# Patient Record
Sex: MALE | Race: WHITE | NOT HISPANIC OR LATINO | Employment: OTHER | URBAN - METROPOLITAN AREA
[De-identification: names, ages, dates, MRNs, and addresses within clinical notes are randomized per-mention and may not be internally consistent; named-entity substitution may affect disease eponyms.]

---

## 2017-12-13 ENCOUNTER — OFFICE VISIT (OUTPATIENT)
Dept: URGENT CARE | Age: 37
End: 2017-12-13
Payer: COMMERCIAL

## 2017-12-13 PROCEDURE — G0382 LEV 3 HOSP TYPE B ED VISIT: HCPCS | Performed by: FAMILY MEDICINE

## 2017-12-13 PROCEDURE — 99283 EMERGENCY DEPT VISIT LOW MDM: CPT | Performed by: FAMILY MEDICINE

## 2017-12-15 NOTE — PROGRESS NOTES
Assessment  1  Sinusitis (473 9) (J32 9)    Plan  Sinusitis    · LevoFLOXacin 500 MG Oral Tablet (Levaquin); TAKE 1 TABLET DAILY ASDIRECTED    Discussion/Summary  Discussion Summary: This was likely viral when it was treated with an antibiotic previously  push fluids and rest Mucinex as directed  Flonase as directed  Cool mist humidifier in your bedroom at bedtime  if no improvement in 3-4 days, begin Levaquin  Probiotics if taking  Avoid weight lifting or running for 2 weeks while taking  Follow up with your family physician if you fail to improve  Medication Side Effects Reviewed: Possible side effects of new medications were reviewed with the patient/guardian today  Understands and agrees with treatment plan: The treatment plan was reviewed with the patient/guardian  The patient/guardian understands and agrees with the treatment plan   Follow Up Instructions: Follow Up with your Primary Care Provider in 5 days  If your symptoms worsen, go to the nearest [place] [address] Emergency Department  Chief Complaint  1  Cold Symptoms  Chief Complaint Free Text Note Form: Was treated with Z-adam x 5 days for sinus infection (skype visit via insurance)  Continues with harsh, dry cough, nasal congestion with forehead pain, sl  chest tightness, occ  chills and more fatigue  Taking Mucinex D  No sore throat, ear pain  No Flu vaccine  History of Present Illness  HPI: 45 y/o male presents with c/o sinus congestion, nassal drainage, and cough x 1 5 weeks  Finished a Lenell Line today  Was ill for 2 days prior to taking Zpak  States fatigue  No fever  Has been taking mucinex D (recommended avoiding decongestants)  Hospital Based Practices Required Assessment:  Pain Assessment  the patient states they have pain  The pain is located in the fatigue and sinuses and cough  (on a scale of 0 to 10, the patient rates the pain at 8 )  Abuse And Domestic Violence Screen   Yes, the patient is safe at home  -- The patient states no one is hurting them  Depression And Suicide Screen  No, the patient has not had thoughts of hurting themself  No, the patient has not felt depressed in the past 7 days  Prefered Language is  [place]  Primary Language is  English  Review of Systems  Focused-Male:  Constitutional: feeling poorly-- and-- feeling tired, but-- no fever  ENT: nasal discharge, but-- no sore throat  Respiratory: cough, but-- no shortness of breath-- and-- no wheezing  ROS Reviewed:   ROS reviewed  Active Problems  1  Encounter for sterilization (V25 2) (Z30 2)   2  Encounter for vasectomy counseling (V25 09) (Z30 09)    Past Medical History  1  History of asthma (V12 69) (Z87 09)  Active Problems And Past Medical History Reviewed: The active problems and past medical history were reviewed and updated today  Family History  Family History Reviewed: The family history was reviewed and updated today  Social History   · Denies alcohol consumption (V49 89) (Z78 9)   · Denied: History of Drug use   · Never a smoker    Surgical History  1  History of Surgery Vas Deferens Vasectomy    Current Meds   1  No Reported Medications Recorded    Allergies  1  Penicillins    Vitals  Signs   Recorded: [id number] 07:09PM   Temperature: 98 1 F, Oral  Heart Rate: 70  Pulse Quality: Regular  Respiration: 18  Systolic: 017, RUE, Sitting  Diastolic: 70, RUE, Sitting  Height: 6 ft 2 in  Weight: 219 lb 3 2 oz  BMI Calculated: 28 14  BSA Calculated: 2 26  O2 Saturation: 97  Pain Scale: 8    Physical Exam   Constitutional ill appearing, NAD  Ears, Nose, Mouth, and Throat  External inspection of ears and nose: Normal    Otoscopic examination: Tympanic membrance translucent with normal light reflex  Canals patent without erythema  Nasal mucosa, septum, and turbinates: Normal without edema or erythema  Oropharynx: Normal with no erythema, edema, exudate or lesions     Pulmonary  Respiratory effort: No increased work of breathing or signs of respiratory distress  Auscultation of lungs: Clear to auscultation  Cardiovascular  Auscultation of heart: Normal rate and rhythm, normal S1 and S2, without murmurs  Lymphatic  Palpation of lymph nodes in neck: No lymphadenopathy     Psychiatric  Orientation to person, place and time: Normal    Mood and affect: Normal        Signatures   Electronically signed by : Tha Christian [facility]; Dec 13 2017  7:26PM EST                       (Author)    Electronically signed by : Ruth Ann Claire DO; Dec 14 2017  7:07AM EST                       (Co-author)

## 2018-01-23 VITALS
HEART RATE: 70 BPM | TEMPERATURE: 98.1 F | OXYGEN SATURATION: 97 % | SYSTOLIC BLOOD PRESSURE: 110 MMHG | DIASTOLIC BLOOD PRESSURE: 70 MMHG | RESPIRATION RATE: 18 BRPM | BODY MASS INDEX: 28.13 KG/M2 | HEIGHT: 74 IN | WEIGHT: 219.2 LBS

## 2019-08-13 ENCOUNTER — APPOINTMENT (OUTPATIENT)
Dept: RADIOLOGY | Facility: OTHER | Age: 39
End: 2019-08-13
Payer: COMMERCIAL

## 2019-08-13 ENCOUNTER — OFFICE VISIT (OUTPATIENT)
Dept: OBGYN CLINIC | Facility: OTHER | Age: 39
End: 2019-08-13
Payer: COMMERCIAL

## 2019-08-13 VITALS
DIASTOLIC BLOOD PRESSURE: 71 MMHG | HEIGHT: 74 IN | SYSTOLIC BLOOD PRESSURE: 104 MMHG | HEART RATE: 50 BPM | WEIGHT: 216.4 LBS | BODY MASS INDEX: 27.77 KG/M2

## 2019-08-13 DIAGNOSIS — M75.22 BICEPS TENDONITIS, LEFT: ICD-10-CM

## 2019-08-13 DIAGNOSIS — M25.512 LEFT SHOULDER PAIN, UNSPECIFIED CHRONICITY: ICD-10-CM

## 2019-08-13 DIAGNOSIS — M75.42 IMPINGEMENT SYNDROME OF LEFT SHOULDER: Primary | ICD-10-CM

## 2019-08-13 PROCEDURE — 99204 OFFICE O/P NEW MOD 45 MIN: CPT | Performed by: ORTHOPAEDIC SURGERY

## 2019-08-13 PROCEDURE — 73030 X-RAY EXAM OF SHOULDER: CPT

## 2019-08-13 NOTE — PROGRESS NOTES
Assessment  Diagnoses and all orders for this visit:    Impingement syndrome of left shoulder  -     XR shoulder 2+ vw left; Future  -     Ambulatory referral to Physical Therapy; Future    Biceps tendonitis, left  -     Ambulatory referral to Physical Therapy; Future        Discussion and Plan:    The patient has an examination consistent with subacromial impingement syndrome of the left shoulder  I have discussed with the patient the pathophysiology of this diagnosis and reviewed how the examination correlates with this diagnosis  Treatment options were discussed at length and after discussing these treatment options, the patient elected for and received a prescription for referral to physical therapy  We will reevaluate the patient in 6-8 weeks  If the symptoms fail to improve with this treatment the patient would be indicated for a trial of a corticosteroid injection or further imaging in the form of an MRI arthrogram scan of the shoulder  I would consider the arthrogram to evaluate for possible labral pathology given his history and symptoms that he is describing as well as to understand the integrity of the rotator cuff structurally  The patient is also complaining of left distal biceps pain and tendinitis of the distal biceps  He may even have a partial injury to the distal biceps from this event he describes 6 months ago  Since he is seeing physical therapy for shoulder I recommended they try to treat his distal biceps tendinitis and if that is unsuccessful then imaging of his left elbow to evaluate the biceps tendon would be indicated  He has a negative biceps active test today so I do not feel there is a full rupture of the distal biceps clinically and therefore imaging is not indicated at this point      Subjective:   Patient ID: Chana Upton is a 43 y o  male      Pt is her today for evaluation for 2 separate chief complaint, the 1st time primary would be his left shoulder pain and the 2nd is left elbow pain at the distal biceps tendon    Pt states that he has been having pain in the posterior and lateral aspects of his left shoulder  Pt denies specific injury  Pt states that he had taken a break from the gym and returned after 3 months  Pt states that when he progressed up to 55 push ups per session about 2 months ago he began having pain  Pt states that he has pain with the pushing motion  Pt denies pain with lifting and pulling  Pt states that he has not tried OTC medication for the pain in his left shoulder  Pt also reports pain at the distal aspect of his biceps and limited strength with curls  Pt states that this has been going on for about 6 months and to begin with a subacute injury at that time year              The following portions of the patient's history were reviewed and updated as appropriate: allergies, current medications, past family history, past medical history, past social history, past surgical history and problem list     Review of Systems   Constitutional: Negative for chills, fever and unexpected weight change  HENT: Negative for hearing loss, nosebleeds and sore throat  Eyes: Negative for pain, redness and visual disturbance  Respiratory: Negative for cough, shortness of breath and wheezing  Cardiovascular: Negative for chest pain, palpitations and leg swelling  Gastrointestinal: Negative for abdominal pain, nausea and vomiting  Endocrine: Negative for polydipsia and polyuria  Genitourinary: Negative for dysuria and hematuria  Skin: Negative for rash and wound  Neurological: Negative for light-headedness, numbness and headaches  Psychiatric/Behavioral: Negative for decreased concentration, dysphoric mood and suicidal ideas  The patient is not nervous/anxious  Objective:  Left Elbow Exam     Tenderness   Left elbow tenderness location: tender to palpation distal biceps tendon       Range of Motion   Extension: normal   Flexion: normal     Muscle Strength   Supination:  5/5     Comments:  Pain with resisted extension with the arm supinated   Biceps intact with biceps active test         Right Shoulder Exam     Range of Motion   The patient has normal right shoulder ROM  Left Shoulder Exam     Tenderness   The patient is experiencing no tenderness  Range of Motion   External rotation: 90   Forward flexion: 180     Muscle Strength   Abduction: 4/5   External rotation: 5/5     Tests   Loyd test: positive    Comments:  IR to mid thoracic  Positive O'britni's test                Physical Exam   Constitutional: He is oriented to person, place, and time  He appears well-developed and well-nourished  HENT:   Head: Normocephalic  Neck: Normal range of motion  Pulmonary/Chest: Effort normal and breath sounds normal    Neurological: He is alert and oriented to person, place, and time  Skin: Skin is warm and dry  Psychiatric: He has a normal mood and affect  I have personally reviewed pertinent films in PACS and my interpretation is as follows      X-rays of the left shoulder performed today show no acute osseous abnormality    Scribe Attestation    I,:   Yen Sims am acting as a scribe while in the presence of the attending physician :        I,:   Erick Crowley MD personally performed the services described in this documentation    as scribed in my presence :

## 2019-08-20 ENCOUNTER — EVALUATION (OUTPATIENT)
Dept: PHYSICAL THERAPY | Facility: OTHER | Age: 39
End: 2019-08-20
Payer: COMMERCIAL

## 2019-08-20 DIAGNOSIS — M75.22 BICEPS TENDONITIS, LEFT: ICD-10-CM

## 2019-08-20 DIAGNOSIS — M75.42 IMPINGEMENT SYNDROME OF LEFT SHOULDER: ICD-10-CM

## 2019-08-20 PROCEDURE — 97110 THERAPEUTIC EXERCISES: CPT | Performed by: PHYSICAL THERAPIST

## 2019-08-20 PROCEDURE — 97162 PT EVAL MOD COMPLEX 30 MIN: CPT | Performed by: PHYSICAL THERAPIST

## 2019-08-20 NOTE — PROGRESS NOTES
PT Evaluation     Today's date: 2019  Patient name: Rei Harding  : 1980  MRN: [id number]  Referring provider: Amol Woods MD  Dx:   Encounter Diagnosis     ICD-10-CM    1  Impingement syndrome of left shoulder M75 42 Ambulatory referral to Physical Therapy   2  Biceps tendonitis, left M75 22 Ambulatory referral to Physical Therapy       Start Time:   Stop Time: 08  Total time in clinic (min): 45 minutes    Assessment  Assessment details: Rei Harding is a pleasant 43 y o  male who presents with L subacromial impingement and biceps tendinitis  The patient's greatest concerns are worry over not knowing what's wrong, wanting to avoid surgery and fear of not being able to keep active  No further referral appears necessary at this time based upon examination results  Primary movement impairment diagnosis of L shoulder pain with movement coordination impairments limiting his ability to care for self, carry, exercise or recreation, go to work, lift, perform household chores and reach overhead  Primary Impairments:  1) poor scap stabilizer strength  2) poor shoulder flexibility  3) L shoulder pain    Etiologic factors include none recalled by the patient  Impairments: abnormal coordination, abnormal muscle firing, abnormal muscle tone, abnormal or restricted ROM, abnormal movement, activity intolerance, difficulty understanding, impaired physical strength, lacks appropriate home exercise program, pain with function, poor posture  and poor body mechanics    Symptom irritability: moderateUnderstanding of Dx/Px/POC: good   Prognosis: good  Prognosis details: Positive prognostic indicators include positive attitude toward recovery  Negative prognostic indicators include chronicity of symptoms, high symptom irritability  Goals  STG's to be achieved in 4 weeks:  1) Patient will have normal pain free AROM ob L shoulder  2) Patient will improve scap strength by 1/2 muscle grade  3) Patient will be able to complete ADL's and household chores such as laundry with no greater than 2/10 shoulder pain  LTG's to be achieved in 8 weeks:  1) Patient will be independent and compliant with HEP  2) Patient will be able to complete 25 pushups with normal mechanics with no greater than 2/10 L shoulder pain  3) Patient will score 77 or greater on FOTO  Plan  Patient would benefit from: skilled physical therapy  Planned modality interventions: thermotherapy: hydrocollator packs  Planned therapy interventions: activity modification, joint mobilization, manual therapy, motor coordination training, neuromuscular re-education, patient education, self care, therapeutic activities, therapeutic exercise, graded activity, home exercise program and behavior modification  Frequency: 2x week  Duration in weeks: 12  Treatment plan discussed with: patient        Subjective Evaluation    History of Present Illness  Date of onset: 2019  Mechanism of injury: Patient reports he has not had injury  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, active in the gym, recently had a heavy work schedule and limited time in the gym and noted when resuming gym schedule pain in the back of the shoulder so painful he is unable to complete a pushup  Patient is unable to complete overhead press or reach out due to pain  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  Notes difficulty when lifting heavy items for work such as stoves, washing machines, ripping up carpet  Notes issues when reaching behind back and is unable to scratch his back     Pain  Current pain ratin  At best pain ratin  At worst pain ratin          Objective     Observations     Additional Observation Details  Forward head, rounded shoulders    Limited pec and lat flexibility b/l when supine on table    Winging scapula b/l with AROM of shoulder    Palpation     Additional Palpation Details  Creppitus, clicking felt in biceps tendon with active ER of L shoulder    Cervical/Thoracic Screen   Thoracic range of motion within normal limits with the following exceptions: rhodes thoracic mobility, kyphosis    Active Range of Motion   Left Shoulder   Normal active range of motion  Flexion: with pain  Abduction: with pain  Internal rotation BTB: with pain    Right Shoulder   Normal active range of motion    Strength/Myotome Testing     Left Shoulder     Planes of Motion   External rotation at 0°: 5   External rotation at 90°: 4+   Internal rotation at 0°: 5   Internal rotation at 90°: 4+     Isolated Muscles   Latissimus: 3+   Lower trapezius: 3+   Middle deltoid: 3+   Middle trapezius: 3+   Serratus anterior: 3+     Right Shoulder     Planes of Motion   External rotation at 0°: 5   External rotation at 90°: 4+   Internal rotation at 0°: 5   Internal rotation at 90°: 4+     Isolated Muscles   Latissimus: 4-   Lower trapezius: 4-   Middle deltoid: 4-   Middle trapezius: 4-   Serratus anterior: 4-     Tests     Left Shoulder   Positive crossover, Hawkin's, Neer's, painful arc, passive horizontal adduction, scapular relocation , scapular assistance test positive and bicep load          Flowsheet Rows      Most Recent Value   PT/OT G-Codes   Current Score  60   Projected Score  77             Precautions: none      Manual  8/20            GH mobs inf/post                                                                     Exercise Diary  8/20            UBE             pulley's             SL ER 5# 3 x 10            scap squeeze w/ ER iso Peach TB 3 x 10 5" hold            pec stretch 10 x 10"            Sleeper stretch standing 10 x 10"            TB rows, LPD             PBALL YTI                                                                                                                                                                             Modalities

## 2019-08-22 ENCOUNTER — OFFICE VISIT (OUTPATIENT)
Dept: PHYSICAL THERAPY | Facility: OTHER | Age: 39
End: 2019-08-22
Payer: COMMERCIAL

## 2019-08-22 DIAGNOSIS — M75.22 BICEPS TENDONITIS, LEFT: ICD-10-CM

## 2019-08-22 DIAGNOSIS — M75.42 IMPINGEMENT SYNDROME OF LEFT SHOULDER: Primary | ICD-10-CM

## 2019-08-22 PROCEDURE — 97110 THERAPEUTIC EXERCISES: CPT

## 2019-08-22 PROCEDURE — 97140 MANUAL THERAPY 1/> REGIONS: CPT | Performed by: PHYSICAL THERAPIST

## 2019-08-22 PROCEDURE — 97112 NEUROMUSCULAR REEDUCATION: CPT | Performed by: PHYSICAL THERAPIST

## 2019-08-22 NOTE — PROGRESS NOTES
Daily Note     Today's date: 2019  Patient name: Brianne Chavez  : 1980  MRN: [id number]  Referring provider: Ezequiel Woodruff MD  Dx:   Encounter Diagnosis     ICD-10-CM    1  Impingement syndrome of left shoulder M75 42    2  Biceps tendonitis, left M75 22        Start Time: 1530  Stop Time: 1615  Total time in clinic (min): 45 minutes  1 on 1 with PT PTA from 330-400, not billed remainder  Subjective: Patient reports improvement in mobility, since IE with added stretches but notes pain is approximately the same  Objective: See treatment diary below      Assessment: Tolerated treatment well  Patient demonstrated fatigue post treatment  Patient easily fatigued with scap strengthening progressing this visit  Patient requiring frequent cueing to avoid upper trap compensation  Plan: Continue per plan of care        Precautions: none      Manual              GH mobs inf/post EB                                                                    Exercise Diary             UBE  2/2           pulley's             SL ER 5# 3 x 10 5# 3 x 10            scap squeeze w/ ER iso Peach TB 3 x 10 5" hold peach TB 3 x 10 5" hol           pec stretch 10 x 10" 30" x 3            Sleeper stretch standing 10 x 10" 10 x 10"           Biceps stretch  10 x 10"           TB rows, LPD  GTB 3 x 10            PBALL YTI  1# 2 x 10           Prone row  8# 3 x 10                                                                                                                                                              Modalities

## 2019-08-27 ENCOUNTER — OFFICE VISIT (OUTPATIENT)
Dept: PHYSICAL THERAPY | Facility: OTHER | Age: 39
End: 2019-08-27
Payer: COMMERCIAL

## 2019-08-27 DIAGNOSIS — M75.42 IMPINGEMENT SYNDROME OF LEFT SHOULDER: Primary | ICD-10-CM

## 2019-08-27 DIAGNOSIS — M75.22 BICEPS TENDONITIS, LEFT: ICD-10-CM

## 2019-08-27 PROCEDURE — 97110 THERAPEUTIC EXERCISES: CPT | Performed by: PHYSICAL THERAPIST

## 2019-08-27 PROCEDURE — 97140 MANUAL THERAPY 1/> REGIONS: CPT | Performed by: PHYSICAL THERAPIST

## 2019-08-27 PROCEDURE — 97112 NEUROMUSCULAR REEDUCATION: CPT | Performed by: PHYSICAL THERAPIST

## 2019-08-27 NOTE — PROGRESS NOTES
Daily Note     Today's date: 2019  Patient name: Caitie Cerda  : 1980  MRN: [id number]  Referring provider: Matt Leonardo MD  Dx:   Encounter Diagnosis     ICD-10-CM    1  Impingement syndrome of left shoulder M75 42    2  Biceps tendonitis, left M75 22        Start Time: 1000  Stop Time: 1050  Total time in clinic (min): 50 minutes  1 on 1 with PT for entirety    Subjective: Patient reports soreness from pushing his stretches to the point of pain  Objective: See treatment diary below      Assessment: Tolerated treatment well  Patient demonstrated fatigue post treatment  Educated patient on stretching, should be comfortable stretch but not until the point of pain, should not have pain beyond cessation of stretch  Patient easily fatigued with scap strengthening progressing this visit  Patient requiring frequent cueing to avoid upper trap compensation  Plan: Continue per plan of care  Consider EPAT for biceps for future visit        Precautions: none      Manual            GH mobs inf/post EB EB EB                                                                  Exercise Diary            UBE  2/2 3/3          SL ER 5# 3 x 10 5# 3 x 10  5# 3 x 10           scap squeeze w/ ER iso Peach TB 3 x 10 5" hold peach TB 3 x 10 5" hol peach TB 3 x 10 5"           pec stretch 10 x 10" 30" x 3  30" x 3           Sleeper stretch standing 10 x 10" 10 x 10" 10 x 10"          Biceps stretch  10 x 10" 10 x 10"          TB rows, LPD  GTB 3 x 10  GTB 3 x 10           PBALL YTI  1# 2 x 10 3 x 10 no resistance          Prone row  8# 3 x 10 8# 2 x 10           Serratus wall slide w/ ER iso   peach 5" x 10          Push up    From 18" x 10                                                                                                                                    Modalities

## 2019-08-29 ENCOUNTER — OFFICE VISIT (OUTPATIENT)
Dept: PHYSICAL THERAPY | Facility: OTHER | Age: 39
End: 2019-08-29
Payer: COMMERCIAL

## 2019-08-29 DIAGNOSIS — M75.42 IMPINGEMENT SYNDROME OF LEFT SHOULDER: Primary | ICD-10-CM

## 2019-08-29 DIAGNOSIS — M75.22 BICEPS TENDONITIS, LEFT: ICD-10-CM

## 2019-08-29 PROCEDURE — 97110 THERAPEUTIC EXERCISES: CPT | Performed by: PHYSICAL THERAPIST

## 2019-08-29 PROCEDURE — 97112 NEUROMUSCULAR REEDUCATION: CPT | Performed by: PHYSICAL THERAPIST

## 2019-08-29 PROCEDURE — 97140 MANUAL THERAPY 1/> REGIONS: CPT | Performed by: PHYSICAL THERAPIST

## 2019-08-29 NOTE — PROGRESS NOTES
Daily Note     Today's date: 2019  Patient name: Kishore Martínez  : 1980  MRN: [id number]  Referring provider: Rod Rojas MD  Dx:   Encounter Diagnosis     ICD-10-CM    1  Impingement syndrome of left shoulder M75 42    2  Biceps tendonitis, left M75 22        Start Time: 1030  Stop Time: 1125  Total time in clinic (min): 55 minutes  1 on 1 with PT from [id number]    Subjective: Patient reports increased soreness in "back of shoulder"   Reports soreness feels more like "muscle soreness" than deep shoulder pain that brought him to PT      Objective: See treatment diary below      Assessment: Tolerated treatment well  Patient demonstrated fatigue post treatment  Did not progress due to soreness following last session  Patient reporting improved soreness following added manuals and self-stretch  Plan: Continue per plan of care  Consider EPAT for biceps for future visit        Precautions: none      Manual           GH mobs inf/post EB EB EB EB         ISTM posterior cuff    EB                                                    Exercise Diary           UBE  2/2 3/3 3/3         SL ER 5# 3 x 10 5# 3 x 10  5# 3 x 10  5# 3 x 10          scap squeeze w/ ER iso Peach TB 3 x 10 5" hold peach TB 3 x 10 5" hol peach TB 3 x 10 5"  peach TB 3 x 10 5"          pec stretch 10 x 10" 30" x 3  30" x 3  30" x 3         Posterior capsule stretch    10 x 10"         Sleeper stretch standing 10 x 10" 10 x 10" 10 x 10" 10 x 10"         Biceps stretch  10 x 10" 10 x 10" 10 x 10"         TB rows, LPD  GTB 3 x 10  GTB 3 x 10  sandy 25# 3 x 10 LPD  50# row         PBALL YTI  1# 2 x 10 3 x 10 no resistance 3 x 10          Prone row  8# 3 x 10 8# 2 x 10  8# 3 x 10          Serratus wall slide w/ ER iso   peach 5" x 10 peach 5" x 10         Push up    From 18" x 10  18" x 10 Modalities

## 2019-09-03 ENCOUNTER — OFFICE VISIT (OUTPATIENT)
Dept: PHYSICAL THERAPY | Facility: OTHER | Age: 39
End: 2019-09-03
Payer: COMMERCIAL

## 2019-09-03 DIAGNOSIS — M75.22 BICEPS TENDONITIS, LEFT: ICD-10-CM

## 2019-09-03 DIAGNOSIS — M75.42 IMPINGEMENT SYNDROME OF LEFT SHOULDER: Primary | ICD-10-CM

## 2019-09-03 PROCEDURE — 97110 THERAPEUTIC EXERCISES: CPT | Performed by: PHYSICAL THERAPIST

## 2019-09-03 PROCEDURE — 97140 MANUAL THERAPY 1/> REGIONS: CPT | Performed by: PHYSICAL THERAPIST

## 2019-09-03 PROCEDURE — 97112 NEUROMUSCULAR REEDUCATION: CPT | Performed by: PHYSICAL THERAPIST

## 2019-09-03 NOTE — PROGRESS NOTES
Daily Note     Today's date: 9/3/2019  Patient name: Tiff Dear  : 1980  MRN: [id number]  Referring provider: Satish Monaco MD  Dx:   Encounter Diagnosis     ICD-10-CM    1  Impingement syndrome of left shoulder M75 42    2  Biceps tendonitis, left M75 22        Start Time: 1330  Stop Time: 1420  Total time in clinic (min): 50 minutes  1 on 1 with PT from 130- 200, not billed remainder    Subjective: Patient reports he resumed "back work" LPD, rows, and biceps and running in the gym without onset of shoulder pain  Objective: See treatment diary below      Assessment: Tolerated treatment well  Patient demonstrated fatigue post treatment  Patient fatigued with added resistance for UBE this visit  Plan: Continue per plan of care  Consider EPAT for biceps for future visit        Precautions: none      Manual  8/20 8/24 8/27 8/29 9/3        GH mobs inf/post EB EB EB EB EB        ISTM posterior cuff    EB EB                                                   Exercise Diary  8/20 8/24 8/27 8/29 9/3        UBE  2/2 3/3 3/3 3/3 Lv 2        SL ER 5# 3 x 10 5# 3 x 10  5# 3 x 10  5# 3 x 10  5# 3 x 10         scap squeeze w/ ER iso Peach TB 3 x 10 5" hold peach TB 3 x 10 5" hol peach TB 3 x 10 5"  peach TB 3 x 10 5"  peach TB 3 x 10 5"         pec stretch 10 x 10" 30" x 3  30" x 3  30" x 3 3 x 30"        Posterior capsule stretch    10 x 10" 3 x 30"        Sleeper stretch standing 10 x 10" 10 x 10" 10 x 10" 10 x 10" 30" x 3        Biceps stretch  10 x 10" 10 x 10" 10 x 10" NP        TB rows, LPD  GTB 3 x 10  GTB 3 x 10  sandy 25# 3 x 10 LPD  50# row sandy 30# 3 x 10 LPD, 50# row        PBALL YTI  1# 2 x 10 3 x 10 no resistance 3 x 10  3 x 10 add resistance NV        Prone row  8# 3 x 10 8# 2 x 10  8# 3 x 10  8# 3 x 10         Serratus wall slide w/ ER iso   peach 5" x 10 peach 5" x 10 peach 5" x 20        Push up    From 18" x 10  18" x 10  From floor 10 x         Box walk over     6", 8" 5 laps Elevated quadruped pull throughs     7 5# KB 3 x 5 ea                                                                                                       Modalities

## 2019-09-06 ENCOUNTER — OFFICE VISIT (OUTPATIENT)
Dept: PHYSICAL THERAPY | Facility: OTHER | Age: 39
End: 2019-09-06
Payer: COMMERCIAL

## 2019-09-06 DIAGNOSIS — M75.22 BICEPS TENDONITIS, LEFT: ICD-10-CM

## 2019-09-06 DIAGNOSIS — M75.42 IMPINGEMENT SYNDROME OF LEFT SHOULDER: Primary | ICD-10-CM

## 2019-09-06 PROCEDURE — 97112 NEUROMUSCULAR REEDUCATION: CPT | Performed by: PHYSICAL THERAPIST

## 2019-09-06 PROCEDURE — 97140 MANUAL THERAPY 1/> REGIONS: CPT | Performed by: PHYSICAL THERAPIST

## 2019-09-06 PROCEDURE — 97110 THERAPEUTIC EXERCISES: CPT | Performed by: PHYSICAL THERAPIST

## 2019-09-06 NOTE — PROGRESS NOTES
Daily Note     Today's date: 10/24/2019  Patient name: Pb Nelson  : 1980  MRN: [id number]  Referring provider: Piyush Augustin MD  Dx:   Encounter Diagnosis     ICD-10-CM    1  Impingement syndrome of left shoulder M75 42    2  Biceps tendonitis, left M75 22        Start Time: 0900  Stop Time: 1000  Total time in clinic (min): 60 minutes  1 on 1 with PT from 900, not billed remainder    Discharge Summary: Patient did not return phone calls from the  about scheduling additional appointments  Patient is discharged from formal PT at this time  Subjective: Patient reports he resumed running without shoulder pains notes he did light machine bench press with slight "twinges" and "stretching" but denies any pain  Objective: See treatment diary below      Assessment: Tolerated treatment well  Patient demonstrated fatigue post treatment  Patient fatigued with resistance for UBE this visit  Patient reported pain free AROM at end range flex, IR, abd after today's manuals  Limited patients session due to patient request, needing to leave for work      Plan: Continue per plan of care         Precautions: none      Manual   9/3 9/       GH mobs inf/post EB EB EB EB EB EB       ISTM posterior cuff    EB EB EB                                                  Exercise Diary  8/20 8/24 8/27 8/29 9/3 9/       UBE  2/2 3/3 33 3/3 Lv 2 3/3 L2       SL ER 5# 3 x 10 5# 3 x 10  5# 3 x 10  5# 3 x 10  5# 3 x 10         scap squeeze w/ ER iso Peach TB 3 x 10 5" hold peach TB 3 x 10 5" hol peach TB 3 x 10 5"  peach TB 3 x 10 5"  peach TB 3 x 10 5"         pec stretch 10 x 10" 30" x 3  30" x 3  30" x 3 3 x 30" 3 x 30"       Posterior capsule stretch    10 x 10" 3 x 30" 30" x 3       Sleeper stretch standing 10 x 10" 10 x 10" 10 x 10" 10 x 10" 30" x 3 30" x 3       Biceps stretch  10 x 10" 10 x 10" 10 x 10" NP        TB rows, LPD  GTB 3 x 10  GTB 3 x 10  sandy 25# 3 x 10 LPD  50# row sandy 30# 3 x 10 LPD, 50# row sandy 30# 3 x 10 LPD, 50# row       PBALL YTI  1# 2 x 10 3 x 10 no resistance 3 x 10  3 x 10 add resistance NV 3 x 10 1#       Prone row  8# 3 x 10 8# 2 x 10  8# 3 x 10  8# 3 x 10         Serratus wall slide w/ ER iso   peach 5" x 10 peach 5" x 10 peach 5" x 20        Push up    From 18" x 10  18" x 10  From floor 10 x         Box walk over     6", 8" 5 laps 6", bosu 8" 10 laps       Elevated quadruped pull throughs     7 5# KB 3 x 5 ea                                                                                                       Modalities

## 2021-01-03 ENCOUNTER — APPOINTMENT (EMERGENCY)
Dept: CT IMAGING | Facility: HOSPITAL | Age: 41
End: 2021-01-03
Payer: COMMERCIAL

## 2021-01-03 ENCOUNTER — HOSPITAL ENCOUNTER (OUTPATIENT)
Facility: HOSPITAL | Age: 41
Setting detail: OBSERVATION
Discharge: HOME | End: 2021-01-04
Attending: EMERGENCY MEDICINE | Admitting: SURGERY
Payer: COMMERCIAL

## 2021-01-03 ENCOUNTER — ANESTHESIA (OUTPATIENT)
Dept: PERIOP | Facility: HOSPITAL | Age: 41
End: 2021-01-03
Payer: COMMERCIAL

## 2021-01-03 ENCOUNTER — ANESTHESIA EVENT (OUTPATIENT)
Dept: PERIOP | Facility: HOSPITAL | Age: 41
End: 2021-01-03
Payer: COMMERCIAL

## 2021-01-03 VITALS — HEART RATE: 79 BPM

## 2021-01-03 DIAGNOSIS — K35.80 ACUTE APPENDICITIS, UNSPECIFIED ACUTE APPENDICITIS TYPE: Primary | ICD-10-CM

## 2021-01-03 LAB
ALBUMIN SERPL BCP-MCNC: 3.9 G/DL (ref 3.5–5)
ALP SERPL-CCNC: 66 U/L (ref 46–116)
ALT SERPL W P-5'-P-CCNC: 20 U/L (ref 12–78)
ANION GAP SERPL CALCULATED.3IONS-SCNC: 5 MMOL/L (ref 4–13)
AST SERPL W P-5'-P-CCNC: 14 U/L (ref 5–45)
BASOPHILS # BLD AUTO: 0.04 THOUSANDS/ΜL (ref 0–0.1)
BASOPHILS NFR BLD AUTO: 0 % (ref 0–1)
BILIRUB SERPL-MCNC: 1.21 MG/DL (ref 0.2–1)
BUN SERPL-MCNC: 12 MG/DL (ref 5–25)
CALCIUM SERPL-MCNC: 9 MG/DL (ref 8.3–10.1)
CHLORIDE SERPL-SCNC: 102 MMOL/L (ref 100–108)
CO2 SERPL-SCNC: 29 MMOL/L (ref 21–32)
CREAT SERPL-MCNC: 1.07 MG/DL (ref 0.6–1.3)
EOSINOPHIL # BLD AUTO: 0.22 THOUSAND/ΜL (ref 0–0.61)
EOSINOPHIL NFR BLD AUTO: 2 % (ref 0–6)
ERYTHROCYTE [DISTWIDTH] IN BLOOD BY AUTOMATED COUNT: 13.2 % (ref 11.6–15.1)
GFR SERPL CREATININE-BSD FRML MDRD: 86 ML/MIN/1.73SQ M
GLUCOSE SERPL-MCNC: 83 MG/DL (ref 65–140)
HCT VFR BLD AUTO: 45.5 % (ref 36.5–49.3)
HGB BLD-MCNC: 15.8 G/DL (ref 12–17)
IMM GRANULOCYTES # BLD AUTO: 0.06 THOUSAND/UL (ref 0–0.2)
IMM GRANULOCYTES NFR BLD AUTO: 1 % (ref 0–2)
LIPASE SERPL-CCNC: 83 U/L (ref 73–393)
LYMPHOCYTES # BLD AUTO: 2.09 THOUSANDS/ΜL (ref 0.6–4.47)
LYMPHOCYTES NFR BLD AUTO: 18 % (ref 14–44)
MCH RBC QN AUTO: 31.7 PG (ref 26.8–34.3)
MCHC RBC AUTO-ENTMCNC: 34.7 G/DL (ref 31.4–37.4)
MCV RBC AUTO: 91 FL (ref 82–98)
MONOCYTES # BLD AUTO: 1.22 THOUSAND/ΜL (ref 0.17–1.22)
MONOCYTES NFR BLD AUTO: 11 % (ref 4–12)
NEUTROPHILS # BLD AUTO: 7.7 THOUSANDS/ΜL (ref 1.85–7.62)
NEUTS SEG NFR BLD AUTO: 68 % (ref 43–75)
NRBC BLD AUTO-RTO: 0 /100 WBCS
PLATELET # BLD AUTO: 184 THOUSANDS/UL (ref 149–390)
PMV BLD AUTO: 10.1 FL (ref 8.9–12.7)
POTASSIUM SERPL-SCNC: 3.9 MMOL/L (ref 3.5–5.3)
PROT SERPL-MCNC: 7.7 G/DL (ref 6.4–8.2)
RBC # BLD AUTO: 4.98 MILLION/UL (ref 3.88–5.62)
SODIUM SERPL-SCNC: 136 MMOL/L (ref 136–145)
WBC # BLD AUTO: 11.33 THOUSAND/UL (ref 4.31–10.16)

## 2021-01-03 PROCEDURE — 99285 EMERGENCY DEPT VISIT HI MDM: CPT | Performed by: EMERGENCY MEDICINE

## 2021-01-03 PROCEDURE — 80053 COMPREHEN METABOLIC PANEL: CPT | Performed by: EMERGENCY MEDICINE

## 2021-01-03 PROCEDURE — 36415 COLL VENOUS BLD VENIPUNCTURE: CPT

## 2021-01-03 PROCEDURE — 44970 LAPAROSCOPY APPENDECTOMY: CPT | Performed by: SURGERY

## 2021-01-03 PROCEDURE — 99285 EMERGENCY DEPT VISIT HI MDM: CPT

## 2021-01-03 PROCEDURE — 83690 ASSAY OF LIPASE: CPT | Performed by: EMERGENCY MEDICINE

## 2021-01-03 PROCEDURE — 74177 CT ABD & PELVIS W/CONTRAST: CPT

## 2021-01-03 PROCEDURE — 96365 THER/PROPH/DIAG IV INF INIT: CPT

## 2021-01-03 PROCEDURE — 88304 TISSUE EXAM BY PATHOLOGIST: CPT | Performed by: PATHOLOGY

## 2021-01-03 PROCEDURE — 85025 COMPLETE CBC W/AUTO DIFF WBC: CPT | Performed by: EMERGENCY MEDICINE

## 2021-01-03 PROCEDURE — 96366 THER/PROPH/DIAG IV INF ADDON: CPT

## 2021-01-03 PROCEDURE — 99218 PR INITIAL OBSERVATION CARE/DAY 30 MINUTES: CPT | Performed by: SURGERY

## 2021-01-03 RX ORDER — PROPOFOL 10 MG/ML
INJECTION, EMULSION INTRAVENOUS CONTINUOUS PRN
Status: DISCONTINUED | OUTPATIENT
Start: 2021-01-03 | End: 2021-01-03

## 2021-01-03 RX ORDER — PROPOFOL 10 MG/ML
INJECTION, EMULSION INTRAVENOUS AS NEEDED
Status: DISCONTINUED | OUTPATIENT
Start: 2021-01-03 | End: 2021-01-03

## 2021-01-03 RX ORDER — BUPIVACAINE HYDROCHLORIDE 2.5 MG/ML
INJECTION, SOLUTION EPIDURAL; INFILTRATION; INTRACAUDAL AS NEEDED
Status: DISCONTINUED | OUTPATIENT
Start: 2021-01-03 | End: 2021-01-03 | Stop reason: HOSPADM

## 2021-01-03 RX ORDER — OXYCODONE HYDROCHLORIDE 5 MG/1
5 TABLET ORAL EVERY 4 HOURS PRN
Status: DISCONTINUED | OUTPATIENT
Start: 2021-01-03 | End: 2021-01-04 | Stop reason: HOSPADM

## 2021-01-03 RX ORDER — NEOSTIGMINE METHYLSULFATE 1 MG/ML
INJECTION INTRAVENOUS AS NEEDED
Status: DISCONTINUED | OUTPATIENT
Start: 2021-01-03 | End: 2021-01-03

## 2021-01-03 RX ORDER — HYDROMORPHONE HCL/PF 1 MG/ML
0.4 SYRINGE (ML) INJECTION
Status: DISCONTINUED | OUTPATIENT
Start: 2021-01-03 | End: 2021-01-03 | Stop reason: HOSPADM

## 2021-01-03 RX ORDER — OXYCODONE HYDROCHLORIDE 10 MG/1
10 TABLET ORAL EVERY 4 HOURS PRN
Status: DISCONTINUED | OUTPATIENT
Start: 2021-01-03 | End: 2021-01-04 | Stop reason: HOSPADM

## 2021-01-03 RX ORDER — LIDOCAINE HYDROCHLORIDE 10 MG/ML
INJECTION, SOLUTION EPIDURAL; INFILTRATION; INTRACAUDAL; PERINEURAL AS NEEDED
Status: DISCONTINUED | OUTPATIENT
Start: 2021-01-03 | End: 2021-01-03

## 2021-01-03 RX ORDER — MEPERIDINE HYDROCHLORIDE 25 MG/ML
12.5 INJECTION INTRAMUSCULAR; INTRAVENOUS; SUBCUTANEOUS
Status: DISCONTINUED | OUTPATIENT
Start: 2021-01-03 | End: 2021-01-03 | Stop reason: HOSPADM

## 2021-01-03 RX ORDER — DIPHENHYDRAMINE HYDROCHLORIDE 50 MG/ML
12.5 INJECTION INTRAMUSCULAR; INTRAVENOUS ONCE AS NEEDED
Status: DISCONTINUED | OUTPATIENT
Start: 2021-01-03 | End: 2021-01-03 | Stop reason: HOSPADM

## 2021-01-03 RX ORDER — SODIUM CHLORIDE, SODIUM LACTATE, POTASSIUM CHLORIDE, CALCIUM CHLORIDE 600; 310; 30; 20 MG/100ML; MG/100ML; MG/100ML; MG/100ML
75 INJECTION, SOLUTION INTRAVENOUS CONTINUOUS
Status: DISCONTINUED | OUTPATIENT
Start: 2021-01-03 | End: 2021-01-04

## 2021-01-03 RX ORDER — GLYCOPYRROLATE 0.2 MG/ML
INJECTION INTRAMUSCULAR; INTRAVENOUS AS NEEDED
Status: DISCONTINUED | OUTPATIENT
Start: 2021-01-03 | End: 2021-01-03

## 2021-01-03 RX ORDER — FENTANYL CITRATE/PF 50 MCG/ML
25 SYRINGE (ML) INJECTION
Status: DISCONTINUED | OUTPATIENT
Start: 2021-01-03 | End: 2021-01-03 | Stop reason: HOSPADM

## 2021-01-03 RX ORDER — CEFEPIME HYDROCHLORIDE 2 G/50ML
INJECTION, SOLUTION INTRAVENOUS AS NEEDED
Status: DISCONTINUED | OUTPATIENT
Start: 2021-01-03 | End: 2021-01-03

## 2021-01-03 RX ORDER — DEXAMETHASONE SODIUM PHOSPHATE 4 MG/ML
INJECTION, SOLUTION INTRA-ARTICULAR; INTRALESIONAL; INTRAMUSCULAR; INTRAVENOUS; SOFT TISSUE AS NEEDED
Status: DISCONTINUED | OUTPATIENT
Start: 2021-01-03 | End: 2021-01-03

## 2021-01-03 RX ORDER — ONDANSETRON 2 MG/ML
4 INJECTION INTRAMUSCULAR; INTRAVENOUS EVERY 4 HOURS PRN
Status: COMPLETED | OUTPATIENT
Start: 2021-01-03 | End: 2021-01-03

## 2021-01-03 RX ORDER — HYDROMORPHONE HCL/PF 1 MG/ML
0.5 SYRINGE (ML) INJECTION
Status: DISCONTINUED | OUTPATIENT
Start: 2021-01-03 | End: 2021-01-04 | Stop reason: HOSPADM

## 2021-01-03 RX ORDER — KETOROLAC TROMETHAMINE 30 MG/ML
INJECTION, SOLUTION INTRAMUSCULAR; INTRAVENOUS AS NEEDED
Status: DISCONTINUED | OUTPATIENT
Start: 2021-01-03 | End: 2021-01-03

## 2021-01-03 RX ORDER — LEVOFLOXACIN 5 MG/ML
750 INJECTION, SOLUTION INTRAVENOUS
Status: DISCONTINUED | OUTPATIENT
Start: 2021-01-03 | End: 2021-01-03

## 2021-01-03 RX ORDER — SUCCINYLCHOLINE/SOD CL,ISO/PF 100 MG/5ML
SYRINGE (ML) INTRAVENOUS AS NEEDED
Status: DISCONTINUED | OUTPATIENT
Start: 2021-01-03 | End: 2021-01-03

## 2021-01-03 RX ORDER — SODIUM CHLORIDE, SODIUM LACTATE, POTASSIUM CHLORIDE, CALCIUM CHLORIDE 600; 310; 30; 20 MG/100ML; MG/100ML; MG/100ML; MG/100ML
INJECTION, SOLUTION INTRAVENOUS CONTINUOUS PRN
Status: DISCONTINUED | OUTPATIENT
Start: 2021-01-03 | End: 2021-01-03

## 2021-01-03 RX ORDER — ROCURONIUM BROMIDE 10 MG/ML
INJECTION, SOLUTION INTRAVENOUS AS NEEDED
Status: DISCONTINUED | OUTPATIENT
Start: 2021-01-03 | End: 2021-01-03

## 2021-01-03 RX ORDER — ONDANSETRON 2 MG/ML
INJECTION INTRAMUSCULAR; INTRAVENOUS AS NEEDED
Status: DISCONTINUED | OUTPATIENT
Start: 2021-01-03 | End: 2021-01-03

## 2021-01-03 RX ORDER — FENTANYL CITRATE 50 UG/ML
INJECTION, SOLUTION INTRAMUSCULAR; INTRAVENOUS AS NEEDED
Status: DISCONTINUED | OUTPATIENT
Start: 2021-01-03 | End: 2021-01-03

## 2021-01-03 RX ADMIN — ONDANSETRON 4 MG: 2 INJECTION INTRAMUSCULAR; INTRAVENOUS at 21:18

## 2021-01-03 RX ADMIN — FENTANYL CITRATE 50 MCG: 50 INJECTION, SOLUTION INTRAMUSCULAR; INTRAVENOUS at 19:57

## 2021-01-03 RX ADMIN — KETOROLAC TROMETHAMINE 30 MG: 30 INJECTION, SOLUTION INTRAMUSCULAR at 20:30

## 2021-01-03 RX ADMIN — ONDANSETRON 4 MG: 2 INJECTION INTRAMUSCULAR; INTRAVENOUS at 20:03

## 2021-01-03 RX ADMIN — ROCURONIUM BROMIDE 5 MG: 10 SOLUTION INTRAVENOUS at 19:57

## 2021-01-03 RX ADMIN — ROCURONIUM BROMIDE 5 MG: 10 SOLUTION INTRAVENOUS at 20:17

## 2021-01-03 RX ADMIN — SODIUM CHLORIDE, SODIUM LACTATE, POTASSIUM CHLORIDE, AND CALCIUM CHLORIDE 125 ML/HR: .6; .31; .03; .02 INJECTION, SOLUTION INTRAVENOUS at 22:21

## 2021-01-03 RX ADMIN — METRONIDAZOLE 500 MG: 500 INJECTION, SOLUTION INTRAVENOUS at 20:03

## 2021-01-03 RX ADMIN — Medication 100 MG: at 19:57

## 2021-01-03 RX ADMIN — CEFEPIME HYDROCHLORIDE 2000 MG: 2 INJECTION, SOLUTION INTRAVENOUS at 19:51

## 2021-01-03 RX ADMIN — NEOSTIGMINE METHYLSULFATE 3 MG: 1 INJECTION INTRAVENOUS at 20:46

## 2021-01-03 RX ADMIN — GLYCOPYRROLATE 0.6 MG: 0.2 INJECTION, SOLUTION INTRAMUSCULAR; INTRAVENOUS at 20:46

## 2021-01-03 RX ADMIN — ROCURONIUM BROMIDE 5 MG: 10 SOLUTION INTRAVENOUS at 20:06

## 2021-01-03 RX ADMIN — ROCURONIUM BROMIDE 15 MG: 10 SOLUTION INTRAVENOUS at 20:01

## 2021-01-03 RX ADMIN — HYDROMORPHONE HYDROCHLORIDE 0.4 MG: 1 INJECTION, SOLUTION INTRAMUSCULAR; INTRAVENOUS; SUBCUTANEOUS at 21:13

## 2021-01-03 RX ADMIN — PROPOFOL 30 MG: 10 INJECTION, EMULSION INTRAVENOUS at 20:41

## 2021-01-03 RX ADMIN — DEXAMETHASONE SODIUM PHOSPHATE 4 MG: 4 INJECTION INTRA-ARTICULAR; INTRALESIONAL; INTRAMUSCULAR; INTRAVENOUS; SOFT TISSUE at 20:03

## 2021-01-03 RX ADMIN — SODIUM CHLORIDE, SODIUM LACTATE, POTASSIUM CHLORIDE, AND CALCIUM CHLORIDE 500 ML: .6; .31; .03; .02 INJECTION, SOLUTION INTRAVENOUS at 16:39

## 2021-01-03 RX ADMIN — FENTANYL CITRATE 25 MCG: 50 INJECTION, SOLUTION INTRAMUSCULAR; INTRAVENOUS at 20:41

## 2021-01-03 RX ADMIN — SODIUM CHLORIDE, SODIUM LACTATE, POTASSIUM CHLORIDE, AND CALCIUM CHLORIDE: .6; .31; .03; .02 INJECTION, SOLUTION INTRAVENOUS at 19:27

## 2021-01-03 RX ADMIN — HYDROMORPHONE HYDROCHLORIDE 0.4 MG: 1 INJECTION, SOLUTION INTRAMUSCULAR; INTRAVENOUS; SUBCUTANEOUS at 21:38

## 2021-01-03 RX ADMIN — PROPOFOL 230 MG: 10 INJECTION, EMULSION INTRAVENOUS at 19:57

## 2021-01-03 RX ADMIN — IOHEXOL 100 ML: 350 INJECTION, SOLUTION INTRAVENOUS at 16:58

## 2021-01-03 RX ADMIN — SODIUM CHLORIDE, SODIUM LACTATE, POTASSIUM CHLORIDE, AND CALCIUM CHLORIDE: .6; .31; .03; .02 INJECTION, SOLUTION INTRAVENOUS at 20:52

## 2021-01-03 RX ADMIN — LIDOCAINE HYDROCHLORIDE 50 MG: 10 INJECTION, SOLUTION EPIDURAL; INFILTRATION; INTRACAUDAL at 19:57

## 2021-01-03 NOTE — ED PROVIDER NOTES
History  Chief Complaint   Patient presents with    Abdominal Pain     pt c/o RLQ pain and nausea starting last pm      38 yr male c/o onset yesterday after lunch with rlq pain - not abrupt-- that has been constant and worsened over time- pos nausea- anorexia/ chills/ tactile fevers last night --  No gu comps- normal bm's-- no recent illness of anykind- going over bumps in car en route to er did hurt abdomen      History provided by:  Patient and spouse   used: No    Abdominal Pain  Pain location:  RLQ  Pain quality: aching    Pain radiates to:  Does not radiate  Associated symptoms: chills and nausea    Associated symptoms: no constipation, no diarrhea, no fatigue, no fever and no vomiting        None       History reviewed  No pertinent past medical history  Past Surgical History:   Procedure Laterality Date    WISDOM TOOTH EXTRACTION         History reviewed  No pertinent family history  I have reviewed and agree with the history as documented  E-Cigarette/Vaping     E-Cigarette/Vaping Substances     Social History     Tobacco Use    Smoking status: Never Smoker    Smokeless tobacco: Never Used   Substance Use Topics    Alcohol use: Never     Frequency: Never    Drug use: Never       Review of Systems   Constitutional: Positive for activity change, appetite change and chills  Negative for diaphoresis, fatigue, fever and unexpected weight change  HENT: Negative  Eyes: Negative  Respiratory: Negative  Cardiovascular: Negative  Gastrointestinal: Positive for abdominal pain and nausea  Negative for abdominal distention, anal bleeding, blood in stool, constipation, diarrhea, rectal pain and vomiting  Endocrine: Negative  Genitourinary: Negative  Musculoskeletal: Negative  Skin: Negative  Allergic/Immunologic: Negative  Neurological: Negative  Hematological: Negative  Psychiatric/Behavioral: Negative          Physical Exam  Physical Exam  Vitals signs and nursing note reviewed  Constitutional:       General: He is not in acute distress  Appearance: He is well-developed  He is not ill-appearing, toxic-appearing or diaphoretic  Comments: avss-- pulse ox 98 % on ra- interpretation is normal- no intervention    HENT:      Head: Normocephalic and atraumatic  Mouth/Throat:      Mouth: Mucous membranes are moist    Eyes:      General: No scleral icterus  Extraocular Movements: Extraocular movements intact  Pupils: Pupils are equal, round, and reactive to light  Comments: Mm pink   Cardiovascular:      Rate and Rhythm: Normal rate and regular rhythm  Heart sounds: Normal heart sounds  No murmur  No friction rub  No gallop  Pulmonary:      Effort: Pulmonary effort is normal  No respiratory distress  Breath sounds: Normal breath sounds  No stridor  No wheezing, rhonchi or rales  Chest:      Chest wall: No tenderness  Abdominal:      General: Abdomen is flat  Bowel sounds are normal  There is no distension or abdominal bruit  There are no signs of injury  Palpations: Abdomen is soft  There is no shifting dullness, fluid wave, hepatomegaly, splenomegaly, mass or pulsatile mass  Tenderness: There is abdominal tenderness in the right lower quadrant  There is no right CVA tenderness, left CVA tenderness, guarding or rebound  Negative signs include Barba's sign, Rovsing's sign, McBurney's sign, psoas sign and obturator sign  Hernia: No hernia is present  There is no hernia in the umbilical area, ventral area, left inguinal area, right femoral area, left femoral area or right inguinal area  Comments: Moderate rlq tenderness to palpation   Skin:     General: Skin is warm  Capillary Refill: Capillary refill takes less than 2 seconds  Coloration: Skin is not cyanotic, jaundiced, mottled or pale  Findings: No erythema or rash  Neurological:      General: No focal deficit present        Mental Status: He is alert and oriented to person, place, and time  Cranial Nerves: No cranial nerve deficit  Motor: No weakness  Comments: Normal non focal neuro exam    Psychiatric:         Mood and Affect: Mood normal  Mood is not anxious or depressed           Behavior: Behavior normal          Vital Signs  ED Triage Vitals   Temperature Pulse Respirations Blood Pressure SpO2   01/03/21 1533 01/03/21 1533 01/03/21 1533 01/03/21 1536 01/03/21 1533   97 9 °F (36 6 °C) 80 18 109/63 98 %      Temp Source Heart Rate Source Patient Position - Orthostatic VS BP Location FiO2 (%)   01/03/21 1533 01/03/21 1533 -- -- --   Oral Monitor         Pain Score       01/03/21 1533       1           Vitals:    01/03/21 1533 01/03/21 1536   BP:  109/63   Pulse: 80          Visual Acuity      ED Medications  Medications   lactated ringers bolus 500 mL (has no administration in time range)       Diagnostic Studies  Results Reviewed     Procedure Component Value Units Date/Time    Comprehensive metabolic panel [[id number]]  (Abnormal) Collected: 01/03/21 1540    Lab Status: Final result Specimen: Blood from Arm, Right Updated: 01/03/21 1608     Sodium 136 mmol/L      Potassium 3 9 mmol/L      Chloride 102 mmol/L      CO2 29 mmol/L      ANION GAP 5 mmol/L      BUN 12 mg/dL      Creatinine 1 07 mg/dL      Glucose 83 mg/dL      Calcium 9 0 mg/dL      AST 14 U/L      ALT 20 U/L      Alkaline Phosphatase 66 U/L      Total Protein 7 7 g/dL      Albumin 3 9 g/dL      Total Bilirubin 1 21 mg/dL      eGFR 86 ml/min/1 73sq m     Narrative:      [place] guidelines for Chronic Kidney Disease (CKD):     Stage 1 with normal or high GFR (GFR > 90 mL/min/1 73 square meters)    Stage 2 Mild CKD (GFR = 60-89 mL/min/1 73 square meters)    Stage 3A Moderate CKD (GFR = 45-59 mL/min/1 73 square meters)    Stage 3B Moderate CKD (GFR = 30-44 mL/min/1 73 square meters)    Stage 4 Severe CKD (GFR = 15-29 mL/min/1 73 square meters)    Stage 5 End Stage CKD (GFR <15 mL/min/1 73 square meters)  Note: GFR calculation is accurate only with a steady state creatinine    Lipase [[id number]]  (Normal) Collected: 01/03/21 1540    Lab Status: Final result Specimen: Blood from Arm, Right Updated: 01/03/21 1608     Lipase 83 u/L     CBC and differential [[id number]]  (Abnormal) Collected: 01/03/21 1540    Lab Status: Final result Specimen: Blood from Arm, Right Updated: 01/03/21 1554     WBC 11 33 Thousand/uL      RBC 4 98 Million/uL      Hemoglobin 15 8 g/dL      Hematocrit 45 5 %      MCV 91 fL      MCH 31 7 pg      MCHC 34 7 g/dL      RDW 13 2 %      MPV 10 1 fL      Platelets 202 Thousands/uL      nRBC 0 /100 WBCs      Neutrophils Relative 68 %      Immat GRANS % 1 %      Lymphocytes Relative 18 %      Monocytes Relative 11 %      Eosinophils Relative 2 %      Basophils Relative 0 %      Neutrophils Absolute 7 70 Thousands/µL      Immature Grans Absolute 0 06 Thousand/uL      Lymphocytes Absolute 2 09 Thousands/µL      Monocytes Absolute 1 22 Thousand/µL      Eosinophils Absolute 0 22 Thousand/µL      Basophils Absolute 0 04 Thousands/µL                  CT abdomen pelvis with contrast    (Results Pending)              Procedures  Procedures         ED Course  ED Course as of Jan 06 0955   Susy Barba Jan 03, 2021   1615 - er md note- labs were first nursed --       80 Er md note- pt offered pain medication refuses at this point      1743 - ER MD NOTE- PT- RE-EVALUATED- SOUND ASLEEP- DISCUSSED ALBS WITH WIFE AND WAITING FOR CT SCAN RESULTS- WILL CONTINUE TO 39 Rushonna Stapleton      0679 - ER MD NOTE- PT- RE-EVALUATED- NOW AWAKE--  PAIN STILL AT LOW LEVEL- AWARE OF PENDING CT SCAN RESUTS      1819 - ER MD NOTE- CT SCAN RESULTS D/W PT AND WIFE WHO ARE AWARE- GEN SURG RESIDENT TIGER TEXTED- WILL COME DOWN TO SEE PT- PT AND WIFE AWARE                                              MDM    Disposition  Final diagnoses:   None     ED Disposition     None Follow-up Information    None         Patient's Medications    No medications on file     No discharge procedures on file      PDMP Review     None          ED Provider  Electronically Signed by           Tamiko Lou MD  01/06/21 7760

## 2021-01-04 VITALS
BODY MASS INDEX: 25.68 KG/M2 | HEART RATE: 52 BPM | WEIGHT: 200 LBS | TEMPERATURE: 98 F | DIASTOLIC BLOOD PRESSURE: 55 MMHG | RESPIRATION RATE: 18 BRPM | SYSTOLIC BLOOD PRESSURE: 101 MMHG | OXYGEN SATURATION: 96 %

## 2021-01-04 LAB
ANION GAP SERPL CALCULATED.3IONS-SCNC: 6 MMOL/L (ref 4–13)
BUN SERPL-MCNC: 13 MG/DL (ref 5–25)
CALCIUM SERPL-MCNC: 8.6 MG/DL (ref 8.3–10.1)
CHLORIDE SERPL-SCNC: 102 MMOL/L (ref 100–108)
CO2 SERPL-SCNC: 26 MMOL/L (ref 21–32)
CREAT SERPL-MCNC: 1.05 MG/DL (ref 0.6–1.3)
ERYTHROCYTE [DISTWIDTH] IN BLOOD BY AUTOMATED COUNT: 13 % (ref 11.6–15.1)
GFR SERPL CREATININE-BSD FRML MDRD: 88 ML/MIN/1.73SQ M
GLUCOSE P FAST SERPL-MCNC: 129 MG/DL (ref 65–99)
GLUCOSE SERPL-MCNC: 129 MG/DL (ref 65–140)
HCT VFR BLD AUTO: 40 % (ref 36.5–49.3)
HGB BLD-MCNC: 13.7 G/DL (ref 12–17)
MCH RBC QN AUTO: 31.4 PG (ref 26.8–34.3)
MCHC RBC AUTO-ENTMCNC: 34.3 G/DL (ref 31.4–37.4)
MCV RBC AUTO: 92 FL (ref 82–98)
PLATELET # BLD AUTO: 169 THOUSANDS/UL (ref 149–390)
PMV BLD AUTO: 10.5 FL (ref 8.9–12.7)
POTASSIUM SERPL-SCNC: 4.3 MMOL/L (ref 3.5–5.3)
RBC # BLD AUTO: 4.36 MILLION/UL (ref 3.88–5.62)
SODIUM SERPL-SCNC: 134 MMOL/L (ref 136–145)
WBC # BLD AUTO: 9.6 THOUSAND/UL (ref 4.31–10.16)

## 2021-01-04 PROCEDURE — 80048 BASIC METABOLIC PNL TOTAL CA: CPT | Performed by: SURGERY

## 2021-01-04 PROCEDURE — 99024 POSTOP FOLLOW-UP VISIT: CPT | Performed by: SURGERY

## 2021-01-04 PROCEDURE — 85027 COMPLETE CBC AUTOMATED: CPT | Performed by: SURGERY

## 2021-01-04 RX ORDER — IBUPROFEN 200 MG
600 TABLET ORAL EVERY 6 HOURS PRN
Refills: 0
Start: 2021-01-04

## 2021-01-04 RX ORDER — ACETAMINOPHEN 325 MG/1
975 TABLET ORAL EVERY 6 HOURS PRN
Refills: 0
Start: 2021-01-04

## 2021-01-04 RX ORDER — OXYCODONE HYDROCHLORIDE 5 MG/1
5 TABLET ORAL EVERY 4 HOURS PRN
Qty: 10 TABLET | Refills: 0 | Status: SHIPPED | OUTPATIENT
Start: 2021-01-04 | End: 2021-01-14

## 2021-01-04 RX ADMIN — SODIUM CHLORIDE, SODIUM LACTATE, POTASSIUM CHLORIDE, AND CALCIUM CHLORIDE 125 ML/HR: .6; .31; .03; .02 INJECTION, SOLUTION INTRAVENOUS at 05:39

## 2021-01-04 RX ADMIN — METRONIDAZOLE 500 MG: 500 INJECTION, SOLUTION INTRAVENOUS at 12:35

## 2021-01-04 RX ADMIN — OXYCODONE HYDROCHLORIDE 5 MG: 5 TABLET ORAL at 11:45

## 2021-01-04 RX ADMIN — CEFTRIAXONE SODIUM 1000 MG: 10 INJECTION, POWDER, FOR SOLUTION INTRAVENOUS at 08:30

## 2021-01-04 RX ADMIN — METRONIDAZOLE 500 MG: 500 INJECTION, SOLUTION INTRAVENOUS at 03:26

## 2021-01-04 NOTE — ANESTHESIA PREPROCEDURE EVALUATION
Procedure:  APPENDECTOMY LAPAROSCOPIC (N/A Abdomen)    Relevant Problems   No relevant active problems        Physical Exam    Airway    Mallampati score: II         Dental   No notable dental hx     Cardiovascular      Pulmonary      Other Findings        Anesthesia Plan  ASA Score- 1 Emergent    Anesthesia Type- general with ASA Monitors  Additional Monitors:   Airway Plan: ETT  Comment: I, Dr Eloy Hernandez, the attending physician, have personally seen and evaluated the patient prior to anesthetic care  I have reviewed the pre-anesthetic record, and other medical records if appropriate to the anesthetic care  If a CRNA is involved in the case, I have reviewed the CRNA assessment, if present, and agree  The patient is in a suitable condition to proceed with my formulated anesthetic plan          Plan Factors-    Chart reviewed  Induction- intravenous  Postoperative Plan-     Informed Consent- Anesthetic plan and risks discussed with patient  I personally reviewed this patient with the CRNA  Discussed and agreed on the Anesthesia Plan with the CRNA  Ayan Reyes

## 2021-01-04 NOTE — ANESTHESIA POSTPROCEDURE EVALUATION
Post-Op Assessment Note    CV Status:  Stable  Pain Score: 0    Pain management: adequate     Mental Status:  Alert and awake   Hydration Status:  Euvolemic   PONV Controlled:  Controlled   Airway Patency:  Patent      Post Op Vitals Reviewed: Yes      Staff: CRNA         No complications documented      BP   128/66   Temp   98 5   Pulse  51   Resp   17   SpO2   100

## 2021-01-04 NOTE — PROGRESS NOTES
Progress Note - general Surgery   Will Bis 38 y o  male MRN: [id number]  Unit/Bed#: S -01 Encounter: [id number]    Assessment:  50-year-old male status post laparoscopic appendectomy on January 3rd    Plan:   Advance diet   Decrease IV fluid   Continue antibiotics for 24 hours after surgery   Ambulation   Possible discharge later today    Subjective/Objective     Subjective:   No acute event overnight, complaint apparently incisions, denies nausea vomiting, urinating okay, tolerating liquid diet    Objective:    Blood pressure 97/53, pulse (!) 46, temperature 97 8 °F (36 6 °C), temperature source Oral, resp  rate 18, weight 90 7 kg (200 lb), SpO2 95 %  ,Body mass index is 25 68 kg/m²        Intake/Output Summary (Last 24 hours) at 1/4/2021 0629  Last data filed at 1/4/2021 0525  Gross per 24 hour   Intake 2383 33 ml   Output 450 ml   Net 1933 33 ml       Invasive Devices     Peripheral Intravenous Line            Peripheral IV 01/03/21 Left Antecubital less than 1 day                Physical Exam:   Gen:  NAD  CV:  warm, well-perfused  Lungs: nl effort  Abd:  soft, appropriately tender, incision clean dry intact  Ext:  no CCE  Neuro: A&Ox3     Results from last 7 days   Lab Units 01/04/21  0529 01/03/21  1540   WBC Thousand/uL 9 60 11 33*   HEMOGLOBIN g/dL 13 7 15 8   HEMATOCRIT % 40 0 45 5   PLATELETS Thousands/uL 169 184     Results from last 7 days   Lab Units 01/04/21  0529 01/03/21  1540   POTASSIUM mmol/L 4 3 3 9   CHLORIDE mmol/L 102 102   CO2 mmol/L 26 29   BUN mg/dL 13 12   CREATININE mg/dL 1 05 1 07   CALCIUM mg/dL 8 6 9 0

## 2021-01-04 NOTE — H&P
H&P Exam - General Surgery   Diego Magallanes 38 y o  male MRN: [id number]  Unit/Bed#: JOSUE [facility]gu[facility] Encounter: [id number]    Assessment/Plan     Assessment:  37 yo M with acute appendicitis    Plan: Will perform laparoscopic appendicitis tonight  The risk and benefit of surgery was explained    History of Present Illness     HPI:  Diego Magallanes is a 38 y o  male who presents with RLQ pain for 24 hours  The pain was generalized at first but moved to RLQ, which was accompanied with nausea  Had fever last night  Had normal bowel movement this morning  Denies cough or SOB  No medical or surgical history  Review of Systems   All other systems reviewed and are negative  Historical Information   History reviewed  No pertinent past medical history    Past Surgical History:   Procedure Laterality Date    WISDOM TOOTH EXTRACTION       Social History   Social History     Substance and Sexual Activity   Alcohol Use Never    Frequency: Never     Social History     Substance and Sexual Activity   Drug Use Never     Social History     Tobacco Use   Smoking Status Never Smoker   Smokeless Tobacco Never Used     E-Cigarette/Vaping     E-Cigarette/Vaping Substances     Family History: non-contributory    Meds/Allergies   all medications and allergies reviewed  Allergies   Allergen Reactions    Penicillins Rash       Objective   First Vitals:   Blood Pressure: 109/63 (01/03/21 1536)  Pulse: 80 (01/03/21 1533)  Temperature: 97 9 °F (36 6 °C) (01/03/21 1533)  Temp Source: Oral (01/03/21 1533)  Respirations: 18 (01/03/21 1533)  Weight - Scale: 90 7 kg (200 lb) (01/03/21 1533)  SpO2: 98 % (01/03/21 1533)    Current Vitals:   Blood Pressure: 109/63 (01/03/21 1536)  Pulse: 80 (01/03/21 1533)  Temperature: 97 9 °F (36 6 °C) (01/03/21 1533)  Temp Source: Oral (01/03/21 1533)  Respirations: 18 (01/03/21 1533)  Weight - Scale: 90 7 kg (200 lb) (01/03/21 1533)  SpO2: 98 % (01/03/21 1533)      Intake/Output Summary (Last 24 hours) at 1/3/2021 735 [facility] filed at 1/3/2021 1810  Gross per 24 hour   Intake 500 ml   Output    Net 500 ml       Invasive Devices     Peripheral Intravenous Line            Peripheral IV 01/03/21 Left Antecubital less than 1 day                Physical Exam  Vitals signs and nursing note reviewed  Constitutional:       Appearance: He is well-developed  HENT:      Head: Normocephalic and atraumatic  Cardiovascular:      Rate and Rhythm: Normal rate and regular rhythm  Heart sounds: Normal heart sounds  Pulmonary:      Effort: Pulmonary effort is normal       Breath sounds: Normal breath sounds  Abdominal:      General: Abdomen is flat and scaphoid  Bowel sounds are normal       Palpations: Abdomen is soft  Tenderness: There is abdominal tenderness in the right lower quadrant, suprapubic area and left lower quadrant  Positive signs include Barba's sign  Skin:     General: Skin is warm  Capillary Refill: Capillary refill takes less than 2 seconds  Neurological:      General: No focal deficit present  Mental Status: He is alert and oriented to person, place, and time  Psychiatric:         Mood and Affect: Mood normal          Behavior: Behavior normal          Lab Results: I have personally reviewed pertinent lab results  Imaging: I have personally reviewed pertinent reports  EKG, Pathology, and Other Studies: I have personally reviewed pertinent reports  Code Status: No Order  Advance Directive and Living Will:      Power of :    POLST:      Counseling / Coordination of Care  Total floor / unit time spent today 30 minutes  Greater than 50% of total time was spent with the patient and / or family counseling and / or coordination of care  A description of the counseling / coordination of care: Malu Lubin

## 2021-01-04 NOTE — PLAN OF CARE
Problem: Potential for Falls  Goal: Patient will remain free of falls  Description: INTERVENTIONS:  - Assess patient frequently for physical needs  -  Identify cognitive and physical deficits and behaviors that affect risk of falls    -  [place] fall precautions as indicated by assessment   - Educate patient/family on patient safety including physical limitations  - Instruct patient to call for assistance with activity based on assessment  - Modify environment to reduce risk of injury  - Consider OT/PT consult to assist with strengthening/mobility  Outcome: Progressing     Problem: CARDIOVASCULAR - ADULT  Goal: Maintains optimal cardiac output and hemodynamic stability  Description: INTERVENTIONS:  - Monitor I/O, vital signs and rhythm  - Monitor for S/S and trends of decreased cardiac output  - Administer and titrate ordered vasoactive medications to optimize hemodynamic stability  - Assess quality of pulses, skin color and temperature  - Assess for signs of decreased coronary artery perfusion  - Instruct patient to report change in severity of symptoms  Outcome: Progressing     Problem: GASTROINTESTINAL - ADULT  Goal: Minimal or absence of nausea and/or vomiting  Description: INTERVENTIONS:  - Administer IV fluids if ordered to ensure adequate hydration  - Maintain NPO status until nausea and vomiting are resolved  - Nasogastric tube if ordered  - Administer ordered antiemetic medications as needed  - Provide nonpharmacologic comfort measures as appropriate  - Advance diet as tolerated, if ordered  - Consider nutrition services referral to assist patient with adequate nutrition and appropriate food choices  Outcome: Progressing  Goal: Maintains or returns to baseline bowel function  Description: INTERVENTIONS:  - Assess bowel function  - Encourage oral fluids to ensure adequate hydration  - Administer IV fluids if ordered to ensure adequate hydration  - Administer ordered medications as needed  - Encourage mobilization and activity  - Consider nutritional services referral to assist patient with adequate nutrition and appropriate food choices  Outcome: Progressing  Goal: Maintains adequate nutritional intake  Description: INTERVENTIONS:  - Monitor percentage of each meal consumed  - Identify factors contributing to decreased intake, treat as appropriate  - Assist with meals as needed  - Monitor I&O, weight, and lab values if indicated  - Obtain nutrition services referral as needed  Outcome: Progressing     Problem: METABOLIC, FLUID AND ELECTROLYTES - ADULT  Goal: Electrolytes maintained within normal limits  Description: INTERVENTIONS:  - Monitor labs and assess patient for signs and symptoms of electrolyte imbalances  - Administer electrolyte replacement as ordered  - Monitor response to electrolyte replacements, including repeat lab results as appropriate  - Instruct patient on fluid and nutrition as appropriate  Outcome: Progressing  Goal: Fluid balance maintained  Description: INTERVENTIONS:  - Monitor labs   - Monitor I/O and WT  - Instruct patient on fluid and nutrition as appropriate  - Assess for signs & symptoms of volume excess or deficit  Outcome: Progressing     Problem: SKIN/TISSUE INTEGRITY - ADULT  Goal: Skin integrity remains intact  Description: INTERVENTIONS  - Identify patients at risk for skin breakdown  - Assess and monitor skin integrity  - Assess and monitor nutrition and hydration status  - Monitor labs (i e  albumin)  - Assess for incontinence   - Turn and reposition patient  - Assist with mobility/ambulation  - Relieve pressure over bony prominences  - Avoid friction and shearing  - Provide appropriate hygiene as needed including keeping skin clean and dry  - Evaluate need for skin moisturizer/barrier cream  - Collaborate with interdisciplinary team (i e  Nutrition, Rehabilitation, etc )   - Patient/family teaching  Outcome: Progressing  Goal: Incision(s), wounds(s) or drain site(s) healing without S/S of infection  Description: INTERVENTIONS  - Assess and document risk factors for skin impairment   - Assess and document dressing, incision, wound bed, drain sites and surrounding tissue  - Consider nutrition services referral as needed  - Oral mucous membranes remain intact  - Provide patient/ family education  Outcome: Progressing  Goal: Oral mucous membranes remain intact  Description: INTERVENTIONS  - Assess oral mucosa and hygiene practices  - Implement preventative oral hygiene regimen  - Implement oral medicated treatments as ordered  - Initiate Nutrition services referral as needed  Outcome: Progressing     Problem: HEMATOLOGIC - ADULT  Goal: Maintains hematologic stability  Description: INTERVENTIONS  - Assess for signs and symptoms of bleeding or hemorrhage  - Monitor labs  - Administer supportive blood products/factors as ordered and appropriate  Outcome: Progressing

## 2021-01-04 NOTE — OP NOTE
OPERATIVE REPORT  PATIENT NAME: Diego Magallanes    :  1980  MRN: [id number]  Pt Location: [facility]    SURGERY DATE: 1/3/2021    Surgeon(s) and Role: Tadeo Chris MD - Primary     * Bret Rodriguez MD - Assisting    Preop Diagnosis:  Acute appendicitis, unspecified acute appendicitis type [D86 97]  With umbilical hernia  Post-Op Diagnosis Codes:     * Acute appendicitis, unspecified acute appendicitis type [D72 84]  With umbilical hernia    Procedure:    Laparoscopic appendectomy with open repair of umbilical hernia  Specimen(s):  ID Type Source Tests Collected by Time Destination   1 : APPENDIX Tissue Appendix TISSUE EXAM Wil Houston MD 1/3/2021 2040        Estimated Blood Loss:   Minimal    Drains:  NG/OG/Enteral Tube Orogastric 18 Fr Right mouth (Active)   Number of days: 0       Urethral Catheter (Active)   Number of days: 0       Anesthesia Type:   General    Operative Indications:  Acute appendicitis, unspecified acute appendicitis type [F52 91]  With umbilical hernia    Operative Findings:  1 5 cm umbilical hernia  Acutely inflamed thickened appendix with purulent exudate covering the whole length of the appendix  Complications:   None    Procedure and Technique:  The patient was brought to the operating room and was identified correctly by myself and the operating room staff  General anesthesia was provided by the Anesthesia team   A Garzon catheter was inserted  Parts were prepped and draped in the standard fashion  A time-out was performed  A transverse supraumbilical incision was made about 2 cm  It was deepened through the fascia  We encountered the umbilical hernia at that point  The umbilical hernia was dissected and the sac was excised  The content of the hernia was fat which was also excised  Through the hernia defect we inserted the Reyes port  Pneumoperitoneum was created  Findings were confirmed  A 12 mm port was then inserted in the left lower quadrant  Another 5 mm port was inserted in the suprapubic region  The appendix was grasped from the tip and the dissection the mesoappendix in the surrounding area was started using EnSeal device  The dissection was carried down to the base of the appendix  We clearly identified the ileocecal junction which was away from the area of her dissection  And [employer] path 45 mm stapler was then introduced and a white load was used to staple off the appendix at the base  Specimen was delivered in the specimen bag  Irrigation was performed of the pelvis as well as the staple line  Hemostasis was adequate  The specimen was removed and handed over to the circulating nurse for pathology  The ports were removed under full visual guidance  The 12 mm port site was closed using 0 Vicryl suture in interrupted fashion  The closure in such a fashion of the supraumbilical port also repaired the hernia  We did not place a mesh as it was a infected case  The skin was then closed using 4-0 Monocryl  The surgical glue was then applied  Garzon catheter was removed  Patient was reversed from anesthesia and was taken to the recovery under stable condition     I was present for the entire procedure    Patient Disposition:  PACU     SIGNATURE: Rosalva Martinez MD  DATE: January 3, 2021  TIME: 8:47 PM

## 2021-01-26 RX ORDER — BUPROPION HYDROCHLORIDE 150 MG/1
TABLET, EXTENDED RELEASE ORAL
COMMUNITY

## 2021-01-26 RX ORDER — ALBUTEROL SULFATE 90 UG/1
AEROSOL, METERED RESPIRATORY (INHALATION)
COMMUNITY
Start: 2014-03-27

## 2021-01-26 RX ORDER — LEVOFLOXACIN 500 MG/1
1 TABLET, FILM COATED ORAL DAILY
COMMUNITY
Start: 2017-12-13

## 2021-01-27 ENCOUNTER — OFFICE VISIT (OUTPATIENT)
Dept: SURGERY | Facility: CLINIC | Age: 41
End: 2021-01-27

## 2021-01-27 VITALS
RESPIRATION RATE: 18 BRPM | WEIGHT: 210 LBS | DIASTOLIC BLOOD PRESSURE: 80 MMHG | HEART RATE: 76 BPM | SYSTOLIC BLOOD PRESSURE: 122 MMHG | HEIGHT: 74 IN | BODY MASS INDEX: 26.95 KG/M2 | TEMPERATURE: 97.9 F

## 2021-01-27 DIAGNOSIS — K35.80 ACUTE APPENDICITIS: ICD-10-CM

## 2021-01-27 DIAGNOSIS — Z48.89 POSTOPERATIVE VISIT: Primary | ICD-10-CM

## 2021-01-27 PROCEDURE — 99024 POSTOP FOLLOW-UP VISIT: CPT | Performed by: SURGERY

## 2021-01-27 NOTE — PROGRESS NOTES
Assessment/Plan:Clean incision with peroxide daily  Daily dry dressing  F/U prn  No problem-specific Assessment & Plan notes found for this encounter  Diagnoses and all orders for this visit:    Postoperative visit    Acute appendicitis    Other orders  -     albuterol (ProAir HFA) 90 mcg/act inhaler; Inhale  -     buPROPion (Wellbutrin SR) 150 mg 12 hr tablet; Take by mouth  -     levofloxacin (LEVAQUIN) 500 mg tablet; Take 1 tablet by mouth daily          Subjective:      Patient ID: Saeid Perez is a 38 y o  male  60-year-old male patient who is 3 weeks status post laparoscopic appendectomy came for follow-up  No complaints of fever, pain  No complaints of diarrhea or constipation  No complaints of difficulty with the eating or drinking  His only complaint is that his left lower quadrant incision is slightly open  There is the minimum drainage from the incision  The following portions of the patient's history were reviewed and updated as appropriate: allergies, current medications, past family history, past medical history, past social history and problem list     Review of Systems   All other systems reviewed and are negative  Objective:      /80 (BP Location: Left arm, Patient Position: Sitting, Cuff Size: Adult)   Pulse 76   Temp 97 9 °F (36 6 °C)   Resp 18   Ht 6' 2" (1 88 m)   Wt 95 3 kg (210 lb)   BMI 26 96 kg/m²          Physical Exam  HENT:      Head: Normocephalic and atraumatic  Nose: Nose normal       Mouth/Throat:      Mouth: Mucous membranes are moist    Eyes:      Pupils: Pupils are equal, round, and reactive to light  Cardiovascular:      Rate and Rhythm: Normal rate and regular rhythm  Pulmonary:      Effort: Pulmonary effort is normal       Breath sounds: Normal breath sounds  Abdominal:      General: Bowel sounds are normal       Palpations: Abdomen is soft  Comments: Supraumbilical and suprapubic incision a completely healed    There is mild separation of skin amounting to 2 mm at the left lower quadrant incision  There is no cellulitis  There is minimum slough  Neurological:      Mental Status: He is alert

## 2022-11-10 ENCOUNTER — APPOINTMENT (OUTPATIENT)
Dept: RADIOLOGY | Age: 42
End: 2022-11-10

## 2022-11-10 ENCOUNTER — HOSPITAL ENCOUNTER (OUTPATIENT)
Dept: RADIOLOGY | Facility: HOSPITAL | Age: 42
Discharge: HOME | End: 2022-11-10

## 2022-11-10 ENCOUNTER — OFFICE VISIT (OUTPATIENT)
Dept: OBGYN CLINIC | Facility: CLINIC | Age: 42
End: 2022-11-10

## 2022-11-10 VITALS
WEIGHT: 207.4 LBS | HEIGHT: 74 IN | HEART RATE: 66 BPM | BODY MASS INDEX: 26.62 KG/M2 | SYSTOLIC BLOOD PRESSURE: 110 MMHG | DIASTOLIC BLOOD PRESSURE: 78 MMHG

## 2022-11-10 DIAGNOSIS — M75.82 TENDINITIS OF LEFT ROTATOR CUFF: ICD-10-CM

## 2022-11-10 DIAGNOSIS — M25.512 ACUTE PAIN OF LEFT SHOULDER: Primary | ICD-10-CM

## 2022-11-10 DIAGNOSIS — M25.512 ACUTE PAIN OF LEFT SHOULDER: ICD-10-CM

## 2022-11-10 DIAGNOSIS — M75.42 IMPINGEMENT SYNDROME OF LEFT SHOULDER: ICD-10-CM

## 2022-11-10 NOTE — PATIENT INSTRUCTIONS
Rotator Cuff Tendinitis   AMBULATORY CARE:   Rotator cuff tendinitis  is inflammation of the tendons in your shoulder joint  A tendon is a cord of tough tissue that connects your muscles to your bones  The rotator cuff is made up of a group of muscles and tendons that hold the shoulder joint in place  Common signs and symptoms:   Pain and swelling in your shoulder, especially when you lift your arm over your head    Pain that is worse after you sleep on the affected shoulder    Pain can become worse and you may have pain even when you are resting    Shoulder and arm weakness    Call your doctor or orthopedist if:   You have sudden shortness of breath or chest pain  Any part of your arm is numb, tingly, cold, blue, or pale  You have pain and swelling in your shoulder even after you take pain medicine  Your skin is itchy, swollen, or has a rash  Your symptoms are not getting better or are getting worse  You have questions or concerns about your condition or care  Treatment  may include any of the following:  Medicines  such as steroids or NSAIDs may be used to reduce swelling  This can help relieve pain  Steroids may be injected into the rotator cuff area  NSAIDs are available without a doctor's order  Ask your healthcare provider which medicine is right for you  Ask how much to take and when to take it  Take as directed  NSAIDs can cause stomach bleeding or kidney problems if not taken correctly  Surgery  may be needed if the pain and tightening in your shoulder do not go away  This may also be done if pain worsens or is so severe that it affects your daily activities  During surgery, your healthcare provider may remove bone spurs and inflamed tissue around the shoulder  Physical therapy  can help you improve movement and strength, and decrease pain  A physical therapist will teach you safe exercises   The exercises may help you move your shoulder normally again and strengthen your rotator cuff  You may learn changes to make to your daily activities that will help decrease stress on your tendons  Care for your rotator cuff tendinitis at home:   Rest as directed  Limit activity on your affected shoulder to decrease stress on the tendon  This may help prevent further damage, decrease pain, and promote healing  Apply ice on your shoulder area  Ice helps decrease swelling and pain  Ice may also help prevent tissue damage  Use an ice pack, or put crushed ice in a plastic bag  Cover it with a towel and place it on your shoulder for 15 to 20 minutes every hour or as directed  Keep your shoulder in the correct position so it will heal faster  This may be done by increasing the height of armrests while you work, drive, and sit  Try not to sleep on the side of your injured shoulder  If you are a woman, wear a sports bra so that the straps are closer to your neck  This may help decrease stress in the affected shoulder  Follow up with your doctor or orthopedist as directed:  Write down your questions so you remember to ask them during your visits  © Copyright [employer] 2022 Information is for End User's use only and may not be sold, redistributed or otherwise used for commercial purposes  All illustrations and images included in CareNotes® are the copyrighted property of A [employer] M [employer] Inc  or Kandace Ruiz   The above information is an  only  It is not intended as medical advice for individual conditions or treatments  Talk to your doctor, nurse or pharmacist before following any medical regimen to see if it is safe and effective for you

## 2022-11-10 NOTE — PROGRESS NOTES
Orthopaedic Surgery - Office Note  Susana Mathias (64 y o  male)   : 1980   MRN: [id number]  Encounter Date: 11/10/2022    Chief Complaint   Patient presents with   • Left Shoulder - Pain         Assessment/Plan  Diagnoses and all orders for this visit:    Acute pain of left shoulder  -     XR shoulder 2+ vw left; Future    Tendinitis of left rotator cuff    Impingement syndrome of left shoulder    The diagnosis as well as treatment options were reviewed with the patient in the office today  Was advised he has subjective complaints and physical exam findings were consistent with rotator cuff tendinitis and injury  I would recommend initial conservative treatment of formal physical therapy and home exercise program   In addition he will use Aleve 1 tablet twice daily with food stopping calling if any stomach upset occurs  Will ice the shoulder 20 minutes on 1 hour off 3 times a day  Will return in 3-4 weeks for repeat evaluation with the orthopedic surgeon at which time if he is not improved we will consider further advanced imaging  A full-thickness rotator cuff tear is felt unlikely at this time due to his lack of trauma  Risks and benefits of a subacromial cortisone injection were discussed at this time we will hold on an injection  Return for Recheck in 3 weeks with Dr Alex Raza  History of Present Illness  This is a new patient with left shoulder pain  Reports he has had symptoms for 3 weeks  He reports that he was doing cross fit wall pushups which involved being upside-down doing pushups leaning against the wall  He felt weakness and fatigue in the left shoulder in the following day had severe pain in the left shoulder  He reports there has been popping and clicking in the shoulder since that time  Reports that CrossFit he has been unable to do overhead lifting for an extended period of time and he is just altered his workouts  No paresthesias or neck pain are reported    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 but no specific trauma to the shoulder that he can recall  Treated in 2019 with Dr Theodora Haskins for left shoulder impingement and distal biceps tendinitis after returning to workout regime  Review of Systems  Pertinent items are noted in HPI  All other systems were reviewed and are negative  Physical Exam  /78   Pulse 66   Ht 6' 2" (1 88 m)   Wt 94 1 kg (207 lb 6 4 oz)   BMI 26 63 kg/m²   Cons: Appears well  No apparent distress  Psych: Alert  Oriented x3  Mood and affect normal   Eyes: PERRLA, EOMI  Resp: Normal effort  No audible wheezing or stridor  CV: Palpable pulse  No discernable arrhythmia  Lymph:  No palpable cervical, axillary, or inguinal lymphadenopathy  Skin: Warm  No palpable masses  No visible lesions  Neuro: Normal muscle tone  Normal and symmetric DTR's  Left shoulder has no skin breakdown lesions or signs of infection  He has no AC joint tenderness  A well-maintained active and passive range of motion which is full  Has end range of motion pain to forward flexion and internal rotation  Has a positive empty can test and noted weakness to internal rotation and external rotation at 4/5  He has a markedly positive impingement sign  He is neurovascularly intact in left upper extremity  There is no shoulder instability on clinical examination with a negative anterior apprehension and negative anterior-posterior compression glide testing  He has no tenderness in the bicipital groove  His elbow exam is within normal limits  Studies Reviewed  X-rays performed in the office today three views of the left shoulder show no acute fractures dislocations or bony abnormalities  He has a type 2 acromion predisposing to impingement  These x-rays read from orthopedic standpoint will await official radiologist interpretation    Procedures  No procedures today      Medical, Surgical, Family, and Social History  The patient's medical history, family history, and social history, were reviewed and updated as appropriate  History reviewed  No pertinent past medical history  Past Surgical History:   Procedure Laterality Date   • SC LAP,APPENDECTOMY N/A 1/3/2021    Procedure: APPENDECTOMY LAPAROSCOPIC;  Surgeon: Aida Cano MD;  Location: [facility];  Service: General   • WISDOM TOOTH EXTRACTION         History reviewed  No pertinent family history      Social History     Occupational History   • Not on file   Tobacco Use   • Smoking status: Never Smoker   • Smokeless tobacco: Never Used   Substance and Sexual Activity   • Alcohol use: Never   • Drug use: Never   • Sexual activity: Not on file       Allergies   Allergen Reactions   • Penicillins Rash         Current Outpatient Medications:   •  albuterol (PROVENTIL HFA,VENTOLIN HFA) 90 mcg/act inhaler, Inhale, Disp: , Rfl:   •  buPROPion (WELLBUTRIN SR) 150 mg 12 hr tablet, Take by mouth, Disp: , Rfl:   •  ibuprofen (MOTRIN) 200 mg tablet, Take 3 tablets (600 mg total) by mouth every 6 (six) hours as needed for mild pain, Disp: , Rfl: 0  •  levofloxacin (LEVAQUIN) 500 mg tablet, Take 1 tablet by mouth daily, Disp: , Rfl:   •  acetaminophen (TYLENOL) 325 mg tablet, Take 3 tablets (975 mg total) by mouth every 6 (six) hours as needed for mild pain (Patient not taking: No sig reported), Disp: , Rfl: 0      Gianni Wolf PA-C

## 2022-11-11 ENCOUNTER — EVALUATION (OUTPATIENT)
Dept: PHYSICAL THERAPY | Age: 42
End: 2022-11-11

## 2022-11-11 DIAGNOSIS — M75.82 TENDINITIS OF LEFT ROTATOR CUFF: ICD-10-CM

## 2022-11-11 DIAGNOSIS — M75.42 IMPINGEMENT SYNDROME OF LEFT SHOULDER: ICD-10-CM

## 2022-11-11 DIAGNOSIS — M25.512 ACUTE PAIN OF LEFT SHOULDER: Primary | ICD-10-CM

## 2022-11-11 NOTE — PROGRESS NOTES
PT Evaluation     Today's date: 2022  Patient name: Rebecca Estrada  : 1980  MRN: [id number]  Referring provider: JUJU Ureña*  Dx:   Encounter Diagnosis     ICD-10-CM    1  Acute pain of left shoulder  M25 512 Ambulatory Referral to Physical Therapy   2  Tendinitis of left rotator cuff  M75 82 Ambulatory Referral to Physical Therapy   3  Impingement syndrome of left shoulder  M75 42 Ambulatory Referral to Physical Therapy                  Assessment  Assessment details: Pt is a 36 y o  male who presents to [facility] with chief c/o L shoulder pain following completion of hand stand push ups about 2 5 weeks ago  Signs and symptoms indicate probable diagnosis of subacromial pain syndrome  He has primary impairments of decreased pain-free shoulder ROM, decreased rotator cuff strength, decreased scapular strength, and increased pain  He is limited functionally as he has difficulty completing ADL's and difficulty completing usual leisure activities (Cross-fit, lifting)  Pt was provided with HEP for scapular and rotator cuff strengthening and capsular stretching  Educated pt on diagnosis/prongsis, activity modifications, POC,  proper completion of HEP and normal response to exercises  He verbalizes understanding of all education provided  All questions answered   Pt would benefit from skilled OP PT in order to improve upon impairments and return to PLOF   Impairments: abnormal or restricted ROM, impaired physical strength, lacks appropriate home exercise program, pain with function and poor posture   Understanding of Dx/Px/POC: good   Prognosis: good    Goals  Short term goals (4-6 weeks)  Pt will improve strength by 1/2 grade in LUE  Pt will improve pain-free L shoulder ROM by 5 degrees in all planes  Pt will report 40-50% functional improvement  Pt will be independent with phase I HEP and activity modifications    Long term goals (8-10 weeks)  Pt will be independent with advanced HEP and symptom managmenet  Pt will return to PLOF and perform normal leisure activities with a 90% reduction in symptoms   Pt will be able to tolerate all lifting/reaching ADL's without pain  Pt will improve FOTO >/= expected        Plan  Planned therapy interventions: patient education, therapeutic exercise, graded exercise, functional ROM exercises, flexibility, home exercise program, manual therapy, activity modification, strengthening, stretching, joint mobilization, massage, therapeutic activities and neuromuscular re-education  Frequency: 2x week  Duration in weeks: 8  Treatment plan discussed with: patient        Subjective Evaluation    History of Present Illness  Mechanism of injury: Pt states that he has a rotator cuff injury per ortho doc  Pt was doing handstand wall push ups  He does cross fit and has learned that snatches are very painful for him  A  press and hand stand push ups were tolerable  He now has random lighting strikes of pain whenever he picks up a pencil or wiping down a table  He has noticed clicking is usually around 90 degrees of shoulder flexion  Not all the time  Denies numbness/tingingling     Pain  Current pain ratin  At best pain ratin  At worst pain ratin  Quality: sharp, radiating, dull ache and discomfort  Relieving factors: rest  Aggravating factors: overhead activity and lifting  Progression: no change      Diagnostic Tests  X-ray: normal  Treatments  Previous treatment: physical therapy  Current treatment: physical therapy  Patient Goals  Patient goals for therapy: increased motion, decreased pain, increased strength and return to sport/leisure activities          Objective     Active Range of Motion   Left Shoulder   Flexion: 160 degrees with pain  Abduction: 160 degrees with pain  External rotation 0°: 70 degrees with pain  External rotation 90°: 90 degrees with pain  Internal rotation 90°: 70 degrees with pain  Internal rotation BTB: T12 with pain    Right Shoulder Normal active range of motion  External rotation 0°: 90 degrees   Internal rotation BTB: T12     Strength/Myotome Testing     Left Shoulder     Planes of Motion   Flexion: 4   Abduction: 4   External rotation at 0°: 4   External rotation at 90°: 4-   Internal rotation at 0°: 4   Internal rotation at 90°: 4-     Right Shoulder   Normal muscle strength    Tests     Left Shoulder   Positive empty can, Hawkin's and painful arc  Negative full can and Speed's                Precautions: None of note      Manuals 11/11            L shoulder PROM             Inf GH mobs grade III/IV                                       Neuro Re-Ed             Body blade             Incline push-up plus             Shoulder taps             [place]                                                    Ther Ex             HEP (rows/ext, IR/ER, corner stretch, post cap stretch) 10'            Pulleys or UBE             Serratus supine             Side-lying ER             Prone ITY's             ER @ 0             Resisted horz abd             Lat pull down             Rows/ext             Standing scaption             Ther Activity                                       Gait Training                                       Modalities

## 2022-11-16 ENCOUNTER — OFFICE VISIT (OUTPATIENT)
Dept: PHYSICAL THERAPY | Age: 42
End: 2022-11-16

## 2022-11-16 DIAGNOSIS — M25.512 ACUTE PAIN OF LEFT SHOULDER: Primary | ICD-10-CM

## 2022-11-16 DIAGNOSIS — M75.42 IMPINGEMENT SYNDROME OF LEFT SHOULDER: ICD-10-CM

## 2022-11-16 DIAGNOSIS — M75.82 TENDINITIS OF LEFT ROTATOR CUFF: ICD-10-CM

## 2022-11-16 NOTE — PROGRESS NOTES
Daily Note     Today's date: 2022  Patient name: Kang Alejo  : 1980  MRN: [id number]  Referring provider: JUJU Solomon*  Dx:   Encounter Diagnosis     ICD-10-CM    1  Acute pain of left shoulder  M25 512       2  Tendinitis of left rotator cuff  M75 82       3  Impingement syndrome of left shoulder  M75 42                      Subjective: Pt states L shoulder symptoms are relatively the same  He was doing the corner stretch every day but still feels like at      Objective: See treatment diary below      Assessment: Pt experiences pain into end ranges of ER so this was avoided  Exhibits decreased strength of scapular and rotator cuff musculature (especially infraspinatus strength)  L shoulder fatigues with low resistance and weight as compared to RUE  He would benefit from continued OP PT in order to improve L shoulder strength and maximize function  Tolerated treatment well  Patient demonstrated fatigue post treatment, exhibited good technique with therapeutic exercises and would benefit from continued PT      Plan: Continue per plan of care        Precautions: None of note      Manuals            L shoulder PROM  NR (flex + ER)           Inf GH mobs grade III/IV  NR                                     Neuro Re-Ed             Body blade             Incline push-up plus             Shoulder taps             [place]                                                    Ther Ex             HEP (rows/ext, IR/ER, corner stretch, post cap stretch) 10'            Pulleys or UBE  4' fwd 4' bkwd           Flex/ext w/ cane  3x12 3#           Serratus supine  3x10 3#           Side-lying ER  3x10            Prone ITY's  3x10            ER @ 0  3x10 yellow TB           Resisted horz abd             Lat pull down             Rows/ext  3x10 12 5#/6 5#           Standing scaption             Ther Activity                                       Gait Training Modalities

## 2022-11-21 ENCOUNTER — OFFICE VISIT (OUTPATIENT)
Dept: PHYSICAL THERAPY | Age: 42
End: 2022-11-21

## 2022-11-21 DIAGNOSIS — M25.512 ACUTE PAIN OF LEFT SHOULDER: Primary | ICD-10-CM

## 2022-11-21 DIAGNOSIS — M75.42 IMPINGEMENT SYNDROME OF LEFT SHOULDER: ICD-10-CM

## 2022-11-21 DIAGNOSIS — M75.82 TENDINITIS OF LEFT ROTATOR CUFF: ICD-10-CM

## 2022-11-21 NOTE — PROGRESS NOTES
Daily Note     Today's date: 2022  Patient name: Nydia Lackey  : 1980  MRN: [id number]  Referring provider: JUJU Matthews*  Dx:   Encounter Diagnosis     ICD-10-CM    1  Acute pain of left shoulder  M25 512       2  Tendinitis of left rotator cuff  M75 82       3  Impingement syndrome of left shoulder  M75 42                      Subjective: Pt states that shoulder pain is at about a 4/10  He was sore after last session  Able to do lower body workout at the gym  Didn't do anything overhead  Backsquats did not bother shoulder, he didn't attempt front squats  Tried deadlifting but the pull through his arm bothered the shoulder so he stopped  Objective: See treatment diary below      Assessment: Pt with improved tolerance to ROM and strengthening exercises  Continues to be challenged by resisted ER and side lying ER 2* rotator cuff weakness  Decreased scapular strength and stability noticed with difficulty with dynamic stabilization exercise and rows  Tolerated treatment well  Patient demonstrated fatigue post treatment, exhibited good technique with therapeutic exercises and would benefit from continued PT      Plan: Continue per plan of care  Progress treatment as tolerated         Precautions: None of note      Manuals           L shoulder PROM  NR (flex + ER) NR (flex + ER)          Inf GH mobs grade III/IV  NR NR          Dynamic stabs @90   30"x3                       Neuro Re-Ed             Body blade             Incline push-up plus             Shoulder taps             Shar[place]ovas                                                    Ther Ex             HEP (rows/ext, IR/ER, corner stretch, post cap stretch) 10'            Pulleys or UBE  4' fwd 4' bkwd 4' fwd 4' bkwd          Flex/ext w/ cane  3x12 3# 3x12 3#          Serratus supine  3x10 3# 3x10 3#          Side-lying ER  3x10  3x10           Prone ITY's  3x10  2x10           ER @ 0  3x10 yellow TB 3x10 Pharmacist Note - Vancomycin Dosing  Therapy day 15  Indication: Severe acute pancreatitis with infected pseudocyst   - S/p laparoscopic debridement of the pancreas and placement of J-tube on 5/18/2020  Current regimen: 1750 mg IV Q 8hrs    A Trough Level resulted at 18.6 mcg/mL which was obtained 8.5 hrs post-dose. The extrapolated \"true\" trough is approximately 19.71 mcg/mL based on the patient's known kinetics. Recent trough history:  - 5/09 = 8.1 mcg/mL (8 hr level) on 1250 mg Q8H  [increase to 1500 mg Q8H]  - 5/11 = 8.2 mcg/mL (9 hr level, extrap 9.73 mcg/mL) on 1500 mg Q8H  [increased to 1750 mg Q8H]   - 5/13 = 10.4 mcg/mL (8 hr level) on 1750 mg Q8H [continue]  - 5/17 = 13.2 mcg/mL (7.5 hr level) on 1750 mg Q8H  [continue]  - 5/21 = 18.6 mcg/mL (8.5 hr level, extrap. to 19.71 mcg/mL) on 1750 mg Q8H    Goal trough: 10 - 15 mcg/mL       Plan: Patient apparently with accumulation of drug (2/2 body habitus? No UO documented/SCr reported at 0.25 mg/dL). Change to 1250 mg IV Q 8hrs for predicted trough of 14.07 mcg/mL Pharmacy will continue to monitor this patient daily for changes in clinical status and renal function.     Courtney Diehl, PharmD  Clinical Pharmacist, Orthopedics and Med/Surg () [id number] [facility] [phone] Resisted horz abd             Lat pull down             Rows/ext  3x10 12 5#/6 5# 3x10 12 5#          Standing scaption             Ther Activity                                       Gait Training                                       Modalities

## 2022-11-29 ENCOUNTER — OFFICE VISIT (OUTPATIENT)
Dept: PHYSICAL THERAPY | Age: 42
End: 2022-11-29

## 2022-11-29 DIAGNOSIS — M75.82 TENDINITIS OF LEFT ROTATOR CUFF: ICD-10-CM

## 2022-11-29 DIAGNOSIS — M25.512 ACUTE PAIN OF LEFT SHOULDER: Primary | ICD-10-CM

## 2022-11-29 DIAGNOSIS — M75.42 IMPINGEMENT SYNDROME OF LEFT SHOULDER: ICD-10-CM

## 2022-11-29 NOTE — PROGRESS NOTES
Daily Note     Today's date: 2022  Patient name: Brit Boo  : 1980  MRN: [id number]  Referring provider: JUJU Sandoval*  Dx:   Encounter Diagnosis     ICD-10-CM    1  Acute pain of left shoulder  M25 512       2  Tendinitis of left rotator cuff  M75 82       3  Impingement syndrome of left shoulder  M75 42                      Subjective: Pt reports the "electrical" sharp pains have been less frequent  Still feels tight especially and he has been stretching it out with wall stretches and corner stretch  Still has pain with certain movements and quick movements with shoulder  Objective: See treatment diary below      Assessment: Pt exhibits adequate ROM into flexion and ER with mild symptoms at end range  More symptomatic into IR and more limited as shoulder requires manual blocking to avoid [address] joint from elevating off of table  Significant weakness of ER and IR when in 90 degrees of abduction  Performed manually resisted exercises in 90/90 position to help strengthen rotator cuff  Progressed some scapular strengthening exercises to promote increase stability  Educated pt to perform resisted ER and IR at home for rotator cuff strengthening and prone scapular exercises  He verbalizes understanding  Tolerated treatment well  Patient demonstrated fatigue post treatment, exhibited good technique with therapeutic exercises and would benefit from continued PT      Plan: Continue per plan of care        Precautions: None of note      Manuals          L shoulder PROM  NR (flex + ER) NR (flex + ER) NR flex, ER, IR         Inf GH mobs grade III/IV  NR NR NR         Dynamic stabs @90   30"x3 30"x3         MRE ER/IR in 90/90    2x10          Neuro Re-Ed             Body blade             Incline push-up plus             Shoulder taps             [place]as                                                    Ther Ex             HEP (rows/ext, IR/ER, corner stretch, post cap stretch) 10'            Pulleys or UBE  4' fwd 4' bkwd 4' fwd 4' bkwd 4' fwd 4' bkwd         Flex/ext w/ cane  3x12 3# 3x12 3# 3x12 3#         Serratus supine  3x10 3# 3x10 3# 3x12 4#         Side-lying ER  3x10  3x10  3x10 1#         Prone ITY's  3x10  2x10  3x10 1#         ER @ 0  3x10 yellow TB 3x10  3x10 yellow TB         Resisted horz abd             Lat pull down             Rows/ext  3x10 12 5#/6 5# 3x10 12 5# Occupied         Standing scaption    3x10 2#         Ther Activity                                       Gait Training                                       Modalities

## 2022-12-02 ENCOUNTER — OFFICE VISIT (OUTPATIENT)
Dept: PHYSICAL THERAPY | Age: 42
End: 2022-12-02

## 2022-12-02 ENCOUNTER — OFFICE VISIT (OUTPATIENT)
Dept: OBGYN CLINIC | Facility: CLINIC | Age: 42
End: 2022-12-02

## 2022-12-02 VITALS
HEIGHT: 74 IN | SYSTOLIC BLOOD PRESSURE: 106 MMHG | BODY MASS INDEX: 26.56 KG/M2 | WEIGHT: 207 LBS | DIASTOLIC BLOOD PRESSURE: 73 MMHG | HEART RATE: 55 BPM

## 2022-12-02 DIAGNOSIS — M25.512 ACUTE PAIN OF LEFT SHOULDER: Primary | ICD-10-CM

## 2022-12-02 DIAGNOSIS — M75.22 BICEPS TENDINITIS OF LEFT UPPER EXTREMITY: Primary | ICD-10-CM

## 2022-12-02 DIAGNOSIS — M75.82 TENDINITIS OF LEFT ROTATOR CUFF: ICD-10-CM

## 2022-12-02 DIAGNOSIS — M75.42 IMPINGEMENT SYNDROME OF LEFT SHOULDER: ICD-10-CM

## 2022-12-02 NOTE — PROGRESS NOTES
Orthopedic Sports Medicine New Patient Visit     Assesment:   36 y o  male with left long head of the biceps tendon    Plan: We had a long discussion regarding his shoulder and ongoing treatment plan  He had no significant traumatic event  I believe most of his symptoms from the biceps did flare up after he did curls for the 1st time in a long time, which is different than his normal [employer] routine  He has been making some progress in physical therapy  I explained that I expect this to continue improving with more physical therapy and time  We discussed injections again but he opted to hold off which I agree with at this time  His rotator cuff strength and shoulder stability is excellent and I do not believe an MRI is needed at this time  I recommend he come back in 6 weeks if the shoulder pain does not resolve  Chief Complaint   Patient presents with   • Left Shoulder - Pain       History of Present Illness: 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 presents with pain in the left shoulder  This started several weeks ago and he has been doing 3 total sessions of physical therapy  Pain has slightly improved  He has not gone back to his normal [employer] activities  Upon further discussion it does sound like the pain started after a day when he did biceps curls for the 1st time in a long time  Pain is all localized to the anterior aspect of the shoulder  Does not have any significant weakness or pain in other areas of the shoulder  Denies any numbness or tingling  At baseline his primary recreational activities CrossFit and he works as a   Past Medical, Social and Family History:  No past medical history on file    Past Surgical History:   Procedure Laterality Date   • NC LAP,APPENDECTOMY N/A 1/3/2021    Procedure: APPENDECTOMY LAPAROSCOPIC;  Surgeon: Claudetta Orchard, MD;  Location: [facility];  Service: General   • WISDOM TOOTH EXTRACTION       Allergies Allergen Reactions   • Penicillins Rash     Current Outpatient Medications on File Prior to Visit   Medication Sig Dispense Refill   • albuterol (PROVENTIL HFA,VENTOLIN HFA) 90 mcg/act inhaler Inhale     • buPROPion (WELLBUTRIN SR) 150 mg 12 hr tablet Take by mouth     • ibuprofen (MOTRIN) 200 mg tablet Take 3 tablets (600 mg total) by mouth every 6 (six) hours as needed for mild pain  0   • acetaminophen (TYLENOL) 325 mg tablet Take 3 tablets (975 mg total) by mouth every 6 (six) hours as needed for mild pain (Patient not taking: Reported on 1/27/2021)  0   • levofloxacin (LEVAQUIN) 500 mg tablet Take 1 tablet by mouth daily       No current facility-administered medications on file prior to visit  Social History     Socioeconomic History   • Marital status: /Civil Union     Spouse name: Not on file   • Number of children: Not on file   • Years of education: Not on file   • Highest education level: Not on file   Occupational History   • Not on file   Tobacco Use   • Smoking status: Never   • Smokeless tobacco: Never   Substance and Sexual Activity   • Alcohol use: Never   • Drug use: Never   • Sexual activity: Not on file   Other Topics Concern   • Not on file   Social History Narrative   • Not on file     Social Determinants of Health     Financial Resource Strain: Not on file   Food Insecurity: Not on file   Transportation Needs: Not on file   Physical Activity: Not on file   Stress: Not on file   Social Connections: Not on file   Intimate Partner Violence: Not on file   Housing Stability: Not on file         I have reviewed the past medical, surgical, social and family history, medications and allergies as documented in the EMR  Review of systems: ROS is negative other than that noted in the HPI  Constitutional: Negative for fatigue and fever  HENT: Negative for sore throat  Respiratory: Negative for shortness of breath  Cardiovascular: Negative for chest pain     Gastrointestinal: Negative for abdominal pain  Endocrine: Negative for cold intolerance and heat intolerance  Genitourinary: Negative for flank pain  Musculoskeletal: Negative for back pain  Skin: Negative for rash  Allergic/Immunologic: Negative for immunocompromised state  Neurological: Negative for dizziness  Psychiatric/Behavioral: Negative for agitation  Physical Exam:    Blood pressure 106/73, pulse 55, height 6' 2" (1 88 m), weight 93 9 kg (207 lb)  General/Constitutional: NAD, well developed, well nourished  HENT: Normocephalic, atraumatic  CV: Intact distal pulses, regular rate  Resp: No respiratory distress or labored breathing  Lymphatic: No lymphadenopathy palpated  Neuro: Alert and Oriented x 3, no focal deficits  Psych: Normal mood, normal affect  Skin: Warm, dry, no rashes, no erythema      Shoulder Exam:      Inspection: No ecchymosis, edema, or deformity  No visualized wounds or skin lesions   Palpation:  Tender over the bicipital groove  Active Motion equal Passive Motion :  FF:  170  ER:  85  IR:  60  Strength:  5/5 FF in scapular plane, 5/5 ER,  5/5 IR   Sensory - SILT in the Radial / Ulnar / Median / Axillary nerve distributions  Motor - AIN / PIN / Radial / Ulnar / Median / Axillary motor nerves in tact  Palpable Radial pulse  Cap refill <2secs in all digits    Negative Loyd  Negative Ezio's  Negative Belly Press  Positive Maldonado  Positive Speed         Imaging    I reviewed and interpreted X-rays of the shoulder which show no acute fractures  No significant degenerative changes  Humeral head is well centered on the glenoid

## 2022-12-02 NOTE — PROGRESS NOTES
Daily Note     Today's date: 2022  Patient name: Fernando Owen  : 1980  MRN: [id number]  Referring provider: JUJU Swann*  Dx:   Encounter Diagnosis     ICD-10-CM    1  Acute pain of left shoulder  M25 512       2  Tendinitis of left rotator cuff  M75 82       3  Impingement syndrome of left shoulder  M75 42                      Subjective: patient reports normal soreness/swelling after last session  He mentions he saw ortho this morning and that they believe PT is the correct path, no injections or surgery planned at this time  Objective: See treatment diary below      Assessment: Tolerated treatment well  Patient requires cuing throughout session to slow down motions and control the movements, poor carryover  He continues to be most limited into IR and require manual blocking to avoid [facility] joint from elevating off of table  He would benefit from continued physical therapy  Plan: Continue per plan of care        Precautions: None of note      Manuals         L shoulder PROM  NR (flex + ER) NR (flex + ER) NR flex, ER, IR SK flex, ER, IR        Inf GH mobs grade III/IV  NR NR NR SK        Dynamic stabs @90   30"x3 30"x3 30"x3        MRE ER/IR in 90/90    2x10  2x10        Neuro Re-Ed             Body blade             Incline push-up plus             Shoulder taps             [place]                                                    Ther Ex             HEP (rows/ext, IR/ER, corner stretch, post cap stretch) 10'            Pulleys or UBE  4' fwd 4' bkwd 4' fwd 4' bkwd 4' fwd 4' bkwd 4' fwd 4' bkwd        Flex/ext w/ cane  3x12 3# 3x12 3# 3x12 3# 3x12 3#        Serratus supine  3x10 3# 3x10 3# 3x12 4# 3x12 4#         Side-lying ER  3x10  3x10  3x10 1# 3x10 1#         Prone ITY's  3x10  2x10  3x10 1# 3x10 1#         ER @ 0  3x10 yellow TB 3x10  3x10 yellow TB 3x10 yellow TB         Resisted horz abd             Lat pull down             Rows/ext  3x10 12 5#/6 5# 3x10 12 5# Occupied 3x10 14#        Standing scaption    3x10 2# 3x10 2#        Ther Activity                                       Gait Training                                       Modalities

## 2022-12-05 ENCOUNTER — OFFICE VISIT (OUTPATIENT)
Dept: PHYSICAL THERAPY | Age: 42
End: 2022-12-05

## 2022-12-05 DIAGNOSIS — M75.42 IMPINGEMENT SYNDROME OF LEFT SHOULDER: ICD-10-CM

## 2022-12-05 DIAGNOSIS — M25.512 ACUTE PAIN OF LEFT SHOULDER: Primary | ICD-10-CM

## 2022-12-05 DIAGNOSIS — M75.82 TENDINITIS OF LEFT ROTATOR CUFF: ICD-10-CM

## 2022-12-05 NOTE — PROGRESS NOTES
Daily Note     Today's date: 2022  Patient name: Xiomara Hinkle  : 1980  MRN: [id number]  Referring provider: JUJU Beltran*  Dx:   Encounter Diagnosis     ICD-10-CM    1  Acute pain of left shoulder  M25 512       2  Tendinitis of left rotator cuff  M75 82       3  Impingement syndrome of left shoulder  M75 42                      Subjective: Pt notes same tightness in his shoulder  Less pain overall  Has tried to continue with the shoulder stretches  Objective: See treatment diary below      Assessment: Pt continues to exhibit weakness in external rotators of L shoulder especially in 90 degrees of abduction  Limited IR with manually blocking of humeral head  Adequately fatigues with completion of rotator and scapular strengthening exercises  He would benefit from continued OP PT in order to improve rotator cuff strength and normalize pain-free L shoulder ROM  Tolerated treatment well  Patient demonstrated fatigue post treatment, exhibited good technique with therapeutic exercises and would benefit from continued PT      Plan: Continue per plan of care  Progress treatment as tolerated         Precautions: None of note      Manuals        L shoulder PROM  NR (flex + ER) NR (flex + ER) NR flex, ER, IR SK flex, ER, IR NR       Inf GH mobs grade III/IV  NR NR NR SK        Dynamic stabs @90   30"x3 30"x3 30"x3 30"x3       MRE ER/IR in 90/90    2x10  2x10 2x10        Neuro Re-Ed             Body blade             Incline push-up plus             Shoulder taps             [place]                                                    Ther Ex             HEP (rows/ext, IR/ER, corner stretch, post cap stretch) 10'            Pulleys or UBE  4' fwd 4' bkwd 4' fwd 4' bkwd 4' fwd 4' bkwd 4' fwd 4' bkwd 4' fwd 4' bkwd        Flex/ext w/ cane  3x12 3# 3x12 3# 3x12 3# 3x12 3# 3x12 3#       Serratus supine  3x10 3# 3x10 3# 3x12 4# 3x12 4#  3x12 4#       Side-lying ER 3x10  3x10  3x10 1# 3x10 1#  3x10 1#       Prone ITY's  3x10  2x10  3x10 1# 3x10 1# 3x10 1#       ER @ 0  3x10 yellow TB 3x10  3x10 yellow TB 3x10 yellow TB  3x10 yellow TB       Resisted horz abd             Lat pull down             Rows/ext  3x10 12 5#/6 5# 3x10 12 5# Occupied 3x10 14# 3x10 20#       Standing scaption    3x10 2# 3x10 2# 3x10 2#       Ther Activity                                       Gait Training                                       Modalities

## 2023-03-14 ENCOUNTER — OFFICE VISIT (OUTPATIENT)
Dept: FAMILY MEDICINE CLINIC | Facility: CLINIC | Age: 43
End: 2023-03-14

## 2023-03-14 VITALS
BODY MASS INDEX: 24.87 KG/M2 | RESPIRATION RATE: 14 BRPM | HEART RATE: 53 BPM | HEIGHT: 74 IN | SYSTOLIC BLOOD PRESSURE: 110 MMHG | WEIGHT: 193.8 LBS | DIASTOLIC BLOOD PRESSURE: 72 MMHG | OXYGEN SATURATION: 100 % | TEMPERATURE: 96.3 F

## 2023-03-14 DIAGNOSIS — Z13.1 SCREENING FOR DIABETES MELLITUS: ICD-10-CM

## 2023-03-14 DIAGNOSIS — Z11.4 SCREENING FOR HIV (HUMAN IMMUNODEFICIENCY VIRUS): ICD-10-CM

## 2023-03-14 DIAGNOSIS — Z13.6 SCREENING FOR CARDIOVASCULAR CONDITION: ICD-10-CM

## 2023-03-14 DIAGNOSIS — M75.82 TENDINITIS OF LEFT ROTATOR CUFF: ICD-10-CM

## 2023-03-14 DIAGNOSIS — Z00.00 ANNUAL PHYSICAL EXAM: Primary | ICD-10-CM

## 2023-03-14 DIAGNOSIS — Z11.59 NEED FOR HEPATITIS C SCREENING TEST: ICD-10-CM

## 2023-03-14 DIAGNOSIS — R53.83 OTHER FATIGUE: ICD-10-CM

## 2023-03-14 NOTE — PROGRESS NOTES
[facility]    NAME: Emerita Pickard  AGE: 36 y o  SEX: male  : 1980     DATE: 3/14/2023     Assessment and Plan:     Problem List Items Addressed This Visit    None  Visit Diagnoses     Annual physical exam    -  Primary    Magalie Beltrán appears healthy on exam   He is to continue a balanced, lower carb diet, and regular exercise  FBW ordered  Screening for diabetes mellitus        Relevant Orders    Comprehensive metabolic panel    Screening for cardiovascular condition        Relevant Orders    Lipid Panel with Direct LDL reflex    Other fatigue        Relevant Orders    CBC and differential    TSH, 3rd generation with Free T4 reflex    Screening for HIV (human immunodeficiency virus)        Relevant Orders    HIV 1/2 AG/AB w Reflex [facility] for 2 yr old and above    Need for hepatitis C screening test        Relevant Orders    Hepatitis C Antibody ([facility])    Tendinitis of left rotator cuff        Improved with PT - suggested that he is re-evaluated by Ortho  Immunizations and preventive care screenings were discussed with patient today  Appropriate education was printed on patient's after visit summary  Discussed risks and benefits of prostate cancer screening  We discussed the controversial history of PSA screening for prostate cancer in the [place] as well as the risk of over detection and over treatment of prostate cancer by way of PSA screening  The patient understands that PSA blood testing is an imperfect way to screen for prostate cancer and that elevated PSA levels in the blood may also be caused by infection, inflammation, prostatic trauma or manipulation, urological procedures, or by benign prostatic enlargement      The role of the digital rectal examination in prostate cancer screening was also discussed and I discussed with him that there is large interobserver variability in the findings of digital rectal examination  Counseling:  Dental Health: discussed importance of regular tooth brushing, flossing, and dental visits  · Exercise: the importance of regular exercise/physical activity was discussed  Recommend exercise 3-5 times per week for at least 30 minutes  BMI Counseling: Body mass index is 25 19 kg/m²  The BMI is above normal  Nutrition recommendations include moderation in carbohydrate intake  Exercise recommendations include exercising 3-5 times per week  No pharmacotherapy was ordered  Patient referred to PCP  Rationale for BMI follow-up plan is due to patient being overweight or obese  Depression Screening and Follow-up Plan: Patient was screened for depression during today's encounter  They screened negative with a PHQ-2 score of 0  Return in 1 year (on 3/14/2024) for Annual physical      Chief Complaint:     Chief Complaint   Patient presents with   • Physical Exam     Patient being seen for physical       History of Present Illness:     Adult Annual Physical   Patient here for a comprehensive physical exam  The patient reports no problems  New Patient to the clinic today  , 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  Former teacher; now has own business real estate investment  Pt is vaccinated against COV-19; has had COV-19 x 2  No family hx for colon cancer / prostate cancer  Mother with hx of breast cancer  Dealing with left RTC tendonitis - Saw Ortho, and worked with PT  Diet and Physical Activity  · Diet/Nutrition: well balanced diet  Intermittent fasting  · Exercise: 3-4 times a week on average  [place] and running  Depression Screening  PHQ-2/9 Depression Screening    Little interest or pleasure in doing things: 0 - not at all  Feeling down, depressed, or hopeless: 0 - not at all  PHQ-2 Score: 0  PHQ-2 Interpretation: Negative depression screen       General Health  · Sleep: sleeps well  · Hearing: normal - bilateral   · Vision: no vision problems     · Dental: regular dental visits   Health  · Symptoms include: none     Review of Systems:     Review of Systems   Constitutional: Negative for activity change  Respiratory: Negative for shortness of breath  Cardiovascular: Negative for chest pain  Gastrointestinal: Negative for abdominal pain and blood in stool  Genitourinary: Negative for decreased urine volume and difficulty urinating  Musculoskeletal: Positive for arthralgias  Psychiatric/Behavioral: Negative for dysphoric mood  The patient is not nervous/anxious  Past Medical History:     History reviewed  No pertinent past medical history  Past Surgical History:     Past Surgical History:   Procedure Laterality Date   • MI LAPAROSCOPIC APPENDECTOMY N/A 1/3/2021    Procedure: APPENDECTOMY LAPAROSCOPIC;  Surgeon: Ramila Augustin MD;  Location: [facility];  Service: General   • WISDOM TOOTH EXTRACTION        Family History:     History reviewed  No pertinent family history     Social History:     Social History     Socioeconomic History   • Marital status: /Civil Union     Spouse name: None   • Number of children: None   • Years of education: None   • Highest education level: None   Occupational History   • None   Tobacco Use   • Smoking status: Never   • Smokeless tobacco: Never   Vaping Use   • Vaping Use: Never used   Substance and Sexual Activity   • Alcohol use: Never   • Drug use: Never   • Sexual activity: None   Other Topics Concern   • None   Social History Narrative   • None     Social Determinants of Health     Financial Resource Strain: Not on file   Food Insecurity: Not on file   Transportation Needs: Not on file   Physical Activity: Not on file   Stress: Not on file   Social Connections: Not on file   Intimate Partner Violence: Not on file   Housing Stability: Not on file      Current Medications:     Current Outpatient Medications   Medication Sig Dispense Refill   • acetaminophen (TYLENOL) 325 mg tablet Take 3 tablets (975 mg total) by mouth every 6 (six) hours as needed for mild pain (Patient not taking: Reported on 3/14/2023)  0   • albuterol (PROVENTIL HFA,VENTOLIN HFA) 90 mcg/act inhaler Inhale (Patient not taking: Reported on 3/14/2023)     • buPROPion (WELLBUTRIN SR) 150 mg 12 hr tablet Take by mouth (Patient not taking: Reported on 3/14/2023)     • ibuprofen (MOTRIN) 200 mg tablet Take 3 tablets (600 mg total) by mouth every 6 (six) hours as needed for mild pain (Patient not taking: Reported on 3/14/2023)  0   • levofloxacin (LEVAQUIN) 500 mg tablet Take 1 tablet by mouth daily (Patient not taking: Reported on 3/14/2023)       No current facility-administered medications for this visit  Allergies: Allergies   Allergen Reactions   • Penicillins Rash      Physical Exam:     /72 (BP Location: Left arm, Patient Position: Sitting, Cuff Size: Large)   Pulse (!) 53   Temp (!) 96 3 °F (35 7 °C) (Tympanic)   Resp 14   Ht 6' 1 54" (1 868 m)   Wt 87 9 kg (193 lb 12 8 oz)   SpO2 100%   BMI 25 19 kg/m²     Physical Exam  Vitals and nursing note reviewed  Constitutional:       General: He is not in acute distress  Appearance: Normal appearance  He is not ill-appearing, toxic-appearing or diaphoretic  HENT:      Head: Normocephalic and atraumatic  Right Ear: Tympanic membrane, ear canal and external ear normal       Left Ear: Tympanic membrane, ear canal and external ear normal       Mouth/Throat:      Mouth: Mucous membranes are moist       Pharynx: Oropharynx is clear  No oropharyngeal exudate or posterior oropharyngeal erythema  Eyes:      General: No scleral icterus  Conjunctiva/sclera: Conjunctivae normal    Cardiovascular:      Rate and Rhythm: Normal rate and regular rhythm  Heart sounds: Normal heart sounds  No murmur heard  No friction rub  No gallop  Pulmonary:      Effort: Pulmonary effort is normal  No respiratory distress  Breath sounds: Normal breath sounds  No stridor   No wheezing, rhonchi or rales  Abdominal:      General: Abdomen is flat  Bowel sounds are normal  There is no distension  Palpations: Abdomen is soft  There is no mass  Tenderness: There is no abdominal tenderness  There is no guarding or rebound  Musculoskeletal:      Cervical back: Normal range of motion and neck supple  No rigidity or tenderness  Lymphadenopathy:      Cervical: No cervical adenopathy  Neurological:      Mental Status: He is alert and oriented to person, place, and time  Psychiatric:         Mood and Affect: Mood normal          Behavior: Behavior normal          Thought Content: Thought content normal          Judgment: Judgment normal           Janeth Paige was seen today for physical exam     Diagnoses and all orders for this visit:    Annual physical exam  Comments:  Janeth Paige appears healthy on exam   He is to continue a balanced, lower carb diet, and regular exercise  FBW ordered  Screening for diabetes mellitus  -     Comprehensive metabolic panel; Future    Screening for cardiovascular condition  -     Lipid Panel with Direct LDL reflex; Future    Other fatigue  -     CBC and differential; Future  -     TSH, 3rd generation with Free T4 reflex; Future    Screening for HIV (human immunodeficiency virus)  -     HIV 1/2 AG/AB w Reflex [facility] for 2 yr old and above; Future    Need for hepatitis C screening test  -     Hepatitis C Antibody ([facility]); Future    Tendinitis of left rotator cuff  Comments:  Improved with PT - suggested that he is re-evaluated by Aneesh Hurtado, [facility]

## 2023-03-22 ENCOUNTER — APPOINTMENT (OUTPATIENT)
Dept: LAB | Age: 43
End: 2023-03-22

## 2023-03-22 DIAGNOSIS — Z11.4 SCREENING FOR HIV (HUMAN IMMUNODEFICIENCY VIRUS): ICD-10-CM

## 2023-03-22 DIAGNOSIS — R53.83 OTHER FATIGUE: ICD-10-CM

## 2023-03-22 DIAGNOSIS — Z13.6 SCREENING FOR CARDIOVASCULAR CONDITION: ICD-10-CM

## 2023-03-22 DIAGNOSIS — Z13.1 SCREENING FOR DIABETES MELLITUS: ICD-10-CM

## 2023-03-22 DIAGNOSIS — Z11.59 NEED FOR HEPATITIS C SCREENING TEST: ICD-10-CM

## 2023-03-22 LAB
ALBUMIN SERPL BCP-MCNC: 4.1 G/DL (ref 3.5–5)
ALP SERPL-CCNC: 61 U/L (ref 46–116)
ALT SERPL W P-5'-P-CCNC: 28 U/L (ref 12–78)
ANION GAP SERPL CALCULATED.3IONS-SCNC: 0 MMOL/L (ref 4–13)
AST SERPL W P-5'-P-CCNC: 19 U/L (ref 5–45)
BASOPHILS # BLD AUTO: 0.04 THOUSANDS/ÂΜL (ref 0–0.1)
BASOPHILS NFR BLD AUTO: 1 % (ref 0–1)
BILIRUB SERPL-MCNC: 0.83 MG/DL (ref 0.2–1)
BUN SERPL-MCNC: 16 MG/DL (ref 5–25)
CALCIUM SERPL-MCNC: 8.9 MG/DL (ref 8.3–10.1)
CHLORIDE SERPL-SCNC: 109 MMOL/L (ref 96–108)
CHOLEST SERPL-MCNC: 209 MG/DL
CO2 SERPL-SCNC: 28 MMOL/L (ref 21–32)
CREAT SERPL-MCNC: 1.07 MG/DL (ref 0.6–1.3)
EOSINOPHIL # BLD AUTO: 0.1 THOUSAND/ÂΜL (ref 0–0.61)
EOSINOPHIL NFR BLD AUTO: 2 % (ref 0–6)
ERYTHROCYTE [DISTWIDTH] IN BLOOD BY AUTOMATED COUNT: 13.6 % (ref 11.6–15.1)
GFR SERPL CREATININE-BSD FRML MDRD: 84 ML/MIN/1.73SQ M
GLUCOSE P FAST SERPL-MCNC: 91 MG/DL (ref 65–99)
HCT VFR BLD AUTO: 42.4 % (ref 36.5–49.3)
HDLC SERPL-MCNC: 72 MG/DL
HGB BLD-MCNC: 14.8 G/DL (ref 12–17)
IMM GRANULOCYTES # BLD AUTO: 0.01 THOUSAND/UL (ref 0–0.2)
IMM GRANULOCYTES NFR BLD AUTO: 0 % (ref 0–2)
LDLC SERPL CALC-MCNC: 126 MG/DL (ref 0–100)
LYMPHOCYTES # BLD AUTO: 1.59 THOUSANDS/ÂΜL (ref 0.6–4.47)
LYMPHOCYTES NFR BLD AUTO: 30 % (ref 14–44)
MCH RBC QN AUTO: 32 PG (ref 26.8–34.3)
MCHC RBC AUTO-ENTMCNC: 34.9 G/DL (ref 31.4–37.4)
MCV RBC AUTO: 92 FL (ref 82–98)
MONOCYTES # BLD AUTO: 0.45 THOUSAND/ÂΜL (ref 0.17–1.22)
MONOCYTES NFR BLD AUTO: 8 % (ref 4–12)
NEUTROPHILS # BLD AUTO: 3.17 THOUSANDS/ÂΜL (ref 1.85–7.62)
NEUTS SEG NFR BLD AUTO: 59 % (ref 43–75)
NRBC BLD AUTO-RTO: 0 /100 WBCS
PLATELET # BLD AUTO: 179 THOUSANDS/UL (ref 149–390)
PMV BLD AUTO: 10.6 FL (ref 8.9–12.7)
POTASSIUM SERPL-SCNC: 4.4 MMOL/L (ref 3.5–5.3)
PROT SERPL-MCNC: 7.2 G/DL (ref 6.4–8.4)
RBC # BLD AUTO: 4.62 MILLION/UL (ref 3.88–5.62)
SODIUM SERPL-SCNC: 137 MMOL/L (ref 135–147)
TRIGL SERPL-MCNC: 56 MG/DL
TSH SERPL DL<=0.05 MIU/L-ACNC: 1.08 UIU/ML (ref 0.45–4.5)
WBC # BLD AUTO: 5.36 THOUSAND/UL (ref 4.31–10.16)

## 2023-03-23 LAB
HCV AB SER QL: NORMAL
HIV 1+2 AB+HIV1 P24 AG SERPL QL IA: NORMAL
HIV 2 AB SERPL QL IA: NORMAL
HIV1 AB SERPL QL IA: NORMAL
HIV1 P24 AG SERPL QL IA: NORMAL

## 2024-01-19 ENCOUNTER — OFFICE VISIT (OUTPATIENT)
Dept: FAMILY MEDICINE CLINIC | Facility: CLINIC | Age: 44
End: 2024-01-19
Payer: COMMERCIAL

## 2024-01-19 VITALS
HEIGHT: 74 IN | SYSTOLIC BLOOD PRESSURE: 126 MMHG | TEMPERATURE: 97.8 F | OXYGEN SATURATION: 97 % | RESPIRATION RATE: 17 BRPM | WEIGHT: 209 LBS | HEART RATE: 87 BPM | DIASTOLIC BLOOD PRESSURE: 74 MMHG | BODY MASS INDEX: 26.82 KG/M2

## 2024-01-19 DIAGNOSIS — J06.9 UPPER RESPIRATORY TRACT INFECTION, UNSPECIFIED TYPE: Primary | ICD-10-CM

## 2024-01-19 PROCEDURE — 99213 OFFICE O/P EST LOW 20 MIN: CPT | Performed by: NURSE PRACTITIONER

## 2024-01-19 PROCEDURE — 87636 SARSCOV2 & INF A&B AMP PRB: CPT | Performed by: NURSE PRACTITIONER

## 2024-01-19 NOTE — ASSESSMENT & PLAN NOTE
Most likely viral given presentation and exam.  Send pcr for covid flu.  Continue mucinex prn, recommend ibuprofen/acetaminophen prn for headache, body aches, etc.  Pt instructed to call for reevaluation if sx worsen or persist.

## 2024-01-19 NOTE — PROGRESS NOTES
Name: Chris Melvin      : 1980      MRN: [id number]  Encounter Provider: ERNST Adler  Encounter Date: 2024   Encounter department: [facility]    Assessment & Plan     1. Upper respiratory tract infection, unspecified type  Assessment & Plan:  Most likely viral given presentation and exam.  Send pcr for covid flu.  Continue mucinex prn, recommend ibuprofen/acetaminophen prn for headache, body aches, etc.  Pt instructed to call for reevaluation if sx worsen or persist.      Orders:  -     Covid/Flu- Office Collect Normal; Future           Subjective      Pt is a 43 y.o. y/o male who is seen today for evaluation of cough and congestion.  He started 2 days ago with a lot of post-nasal drip and started today with thick mucus in his nose.  He also has fatigue, sensitive skin, trouble taking a deep breath.  He is coughing, no significant sputum production.  Appetite is decreased, denies n/v/d.  No ear pain.  He is using mucinex prn.    Review of Systems   Constitutional:  Positive for appetite change and fatigue. Negative for chills and fever.   HENT:  Positive for congestion, postnasal drip and sore throat. Negative for hearing loss, sinus pressure and trouble swallowing.    Respiratory:  Positive for cough and shortness of breath. Negative for chest tightness and wheezing.    Gastrointestinal:  Negative for diarrhea, nausea and vomiting.   Musculoskeletal:  Negative for myalgias.   Neurological:  Positive for headaches. Negative for dizziness and light-headedness.   Hematological:  Negative for adenopathy.       Current Outpatient Medications on File Prior to Visit   Medication Sig    [DISCONTINUED] acetaminophen (TYLENOL) 325 mg tablet Take 3 tablets (975 mg total) by mouth every 6 (six) hours as needed for mild pain (Patient not taking: Reported on 3/14/2023)    [DISCONTINUED] albuterol (PROVENTIL HFA,VENTOLIN HFA) 90 mcg/act inhaler Inhale (Patient not taking: Reported  "on 3/14/2023)    [DISCONTINUED] buPROPion (WELLBUTRIN SR) 150 mg 12 hr tablet Take by mouth (Patient not taking: Reported on 3/14/2023)    [DISCONTINUED] ibuprofen (MOTRIN) 200 mg tablet Take 3 tablets (600 mg total) by mouth every 6 (six) hours as needed for mild pain (Patient not taking: Reported on 3/14/2023)    [DISCONTINUED] levofloxacin (LEVAQUIN) 500 mg tablet Take 1 tablet by mouth daily (Patient not taking: Reported on 3/14/2023)       Objective     /74 (BP Location: Right arm, Patient Position: Sitting, Cuff Size: Standard)   Pulse 87   Temp 97.8 °F (36.6 °C) (Tympanic)   Resp 17   Ht 6' 1.54\" (1.868 m)   Wt 94.8 kg (209 lb)   SpO2 97%   BMI 27.17 kg/m²     Physical Exam  Vitals reviewed.   Constitutional:       General: He is awake. He is not in acute distress.     Appearance: Normal appearance. He is well-developed and well-groomed. He is not ill-appearing.   HENT:      Head: Normocephalic.      Right Ear: Hearing, tympanic membrane, ear canal and external ear normal. No middle ear effusion.      Left Ear: Hearing, tympanic membrane, ear canal and external ear normal.  No middle ear effusion.      Nose: Nose normal. No mucosal edema.      Right Turbinates: Enlarged.      Left Turbinates: Enlarged.      Mouth/Throat:      Lips: Pink.      Mouth: Mucous membranes are moist. Mucous membranes are not dry.      Pharynx: No oropharyngeal exudate (post nasal drainage) or posterior oropharyngeal erythema.      Tonsils: No tonsillar abscesses.   Eyes:      General: Lids are normal.      Conjunctiva/sclera: Conjunctivae normal.   Cardiovascular:      Rate and Rhythm: Normal rate and regular rhythm.      Heart sounds: Normal heart sounds. No murmur heard.  Pulmonary:      Effort: Pulmonary effort is normal.      Breath sounds: Normal breath sounds.   Lymphadenopathy:      Head:      Right side of head: No submental, submandibular, tonsillar, preauricular, posterior auricular or occipital adenopathy.    "   Left side of head: No submental, submandibular, tonsillar, preauricular, posterior auricular or occipital adenopathy.      Cervical: No cervical adenopathy.   Skin:     General: Skin is warm and dry.   Neurological:      Mental Status: He is alert and oriented to person, place, and time.   Psychiatric:         Attention and Perception: Attention normal.         Mood and Affect: Mood normal.         Speech: Speech normal.         Behavior: Behavior normal. Behavior is cooperative.         Thought Content: Thought content normal.         Cognition and Memory: Cognition normal.         Judgment: Judgment normal.       ERNST Adler

## 2024-01-20 LAB
FLUAV RNA RESP QL NAA+PROBE: NEGATIVE
FLUBV RNA RESP QL NAA+PROBE: NEGATIVE
SARS-COV-2 RNA RESP QL NAA+PROBE: NEGATIVE

## 2024-03-01 ENCOUNTER — RA CDI HCC (OUTPATIENT)
Dept: OTHER | Facility: HOSPITAL | Age: 44
End: 2024-03-01

## 2024-03-15 ENCOUNTER — OFFICE VISIT (OUTPATIENT)
Dept: FAMILY MEDICINE CLINIC | Facility: CLINIC | Age: 44
End: 2024-03-15
Payer: COMMERCIAL

## 2024-03-15 VITALS
RESPIRATION RATE: 16 BRPM | HEART RATE: 57 BPM | TEMPERATURE: 95.9 F | HEIGHT: 74 IN | WEIGHT: 210.8 LBS | DIASTOLIC BLOOD PRESSURE: 74 MMHG | SYSTOLIC BLOOD PRESSURE: 104 MMHG | OXYGEN SATURATION: 99 % | BODY MASS INDEX: 27.05 KG/M2

## 2024-03-15 DIAGNOSIS — Z00.00 ANNUAL PHYSICAL EXAM: Primary | ICD-10-CM

## 2024-03-15 DIAGNOSIS — Z13.220 ENCOUNTER FOR LIPID SCREENING FOR CARDIOVASCULAR DISEASE: ICD-10-CM

## 2024-03-15 DIAGNOSIS — Z13.1 SCREENING FOR DIABETES MELLITUS (DM): ICD-10-CM

## 2024-03-15 DIAGNOSIS — M75.42 IMPINGEMENT SYNDROME OF LEFT SHOULDER: ICD-10-CM

## 2024-03-15 DIAGNOSIS — R53.83 OTHER FATIGUE: ICD-10-CM

## 2024-03-15 DIAGNOSIS — Z12.11 COLON CANCER SCREENING: ICD-10-CM

## 2024-03-15 DIAGNOSIS — Z13.6 ENCOUNTER FOR LIPID SCREENING FOR CARDIOVASCULAR DISEASE: ICD-10-CM

## 2024-03-15 PROCEDURE — 99396 PREV VISIT EST AGE 40-64: CPT | Performed by: FAMILY MEDICINE

## 2024-03-15 NOTE — PROGRESS NOTES
ADULT ANNUAL PHYSICAL  [facility] PRACTICE    NAME: Chris Melvin  AGE: 43 y.o. SEX: male  : 1980     DATE: 3/15/2024     Assessment and Plan:     Problem List Items Addressed This Visit       Impingement syndrome of left shoulder     - Improved from previous.  Still has periods where it flares up.  -Recommend starting physical therapy.  Patient will hold off for now.  -Home exercise program discussed.   -Follow-up as needed.            Other Visit Diagnoses       Annual physical exam    -  Primary    BMI 27.0-27.9,adult        Other fatigue        Relevant Orders    TSH, 3rd generation with Free T4 reflex    CBC and differential    Vitamin D 25 hydroxy    Encounter for lipid screening for cardiovascular disease        Relevant Orders    Lipid Panel With Direct LDL    Screening for diabetes mellitus (DM)        Relevant Orders    Comprehensive metabolic panel    Colon cancer screening        Relevant Orders    Ambulatory Referral to Gastroenterology              Immunizations and preventive care screenings were discussed with patient today. Appropriate education was printed on patient's after visit summary.    Discussed risks and benefits of prostate cancer screening. We discussed the controversial history of PSA screening for prostate cancer in the [place] as well as the risk of over detection and over treatment of prostate cancer by way of PSA screening.  The patient understands that PSA blood testing is an imperfect way to screen for prostate cancer and that elevated PSA levels in the blood may also be caused by infection, inflammation, prostatic trauma or manipulation, urological procedures, or by benign prostatic enlargement.    The role of the digital rectal examination in prostate cancer screening was also discussed and I discussed with him that there is large interobserver variability in the findings of digital rectal  examination.    Counseling:  Alcohol/drug use: discussed moderation in alcohol intake, the recommendations for healthy alcohol use, and avoidance of illicit drug use.  Dental Health: discussed importance of regular tooth brushing, flossing, and dental visits.  Injury prevention: discussed safety/seat belts, safety helmets, smoke detectors, carbon dioxide detectors, and smoking near bedding or upholstery.  Sexual health: discussed sexually transmitted diseases, partner selection, use of condoms, avoidance of unintended pregnancy, and contraceptive alternatives.  Exercise: the importance of regular exercise/physical activity was discussed. Recommend exercise 3-5 times per week for at least 30 minutes.          No follow-ups on file.     Chief Complaint:     Chief Complaint   Patient presents with    Physical Exam     Patietn being seen for a physical exam      History of Present Illness:     Adult Annual Physical   Patient here for a comprehensive physical exam. The patient reports  he is doing well overall.  Continues in real state and doing well.  Working on continuing to improve dietary habits and increasing exercise.  Will work on getting adequate sleep .    Diet and Physical Activity  Diet/Nutrition: well balanced diet and consuming 3-5 servings of fruits/vegetables daily.   Exercise: moderate cardiovascular exercise and 3-4 times a week on average.      Depression Screening  PHQ-2/9 Depression Screening    Little interest or pleasure in doing things: 0 - not at all  Feeling down, depressed, or hopeless: 0 - not at all       General Health  Sleep: gets 4-6 hours of sleep on average.   Hearing: normal - bilateral.  Vision: goes for regular eye exams.   Dental: regular dental visits and brushes teeth twice daily.        Health  Symptoms include: none    Advanced Care Planning  Do you have an advanced directive? no  Do you have a durable medical power of ? no  ACP document given to patient? no     Review  of Systems:     Review of Systems   Constitutional:  Negative for chills and fever.   HENT:  Negative for ear pain and sore throat.    Eyes:  Negative for pain and visual disturbance.   Respiratory:  Negative for cough and shortness of breath.    Cardiovascular:  Negative for chest pain and palpitations.   Gastrointestinal:  Negative for abdominal pain and vomiting.   Genitourinary:  Negative for dysuria and hematuria.   Musculoskeletal:  Negative for arthralgias, back pain and neck pain.        Left shoulder pain   Skin:  Negative for color change and rash.   Neurological:  Negative for seizures and syncope.   Psychiatric/Behavioral:  Negative for confusion and sleep disturbance. The patient is not nervous/anxious.    All other systems reviewed and are negative.     Past Medical History:     No past medical history on file.   Past Surgical History:     Past Surgical History:   Procedure Laterality Date    APPENDECTOMY  2021    CT LAPAROSCOPIC APPENDECTOMY N/A 01/03/2021    Procedure: APPENDECTOMY LAPAROSCOPIC;  Surgeon: Avery King MD;  Location: [facility];  Service: General    WISDOM TOOTH EXTRACTION        Family History:     No family history on file.   Social History:     Social History     Socioeconomic History    Marital status: /Civil Union     Spouse name: None    Number of children: None    Years of education: None    Highest education level: None   Occupational History    None   Tobacco Use    Smoking status: Never    Smokeless tobacco: Never   Vaping Use    Vaping status: Never Used   Substance and Sexual Activity    Alcohol use: Never    Drug use: Never    Sexual activity: Yes     Partners: Female     Birth control/protection: None   Other Topics Concern    None   Social History Narrative    None     Social Determinants of Health     Financial Resource Strain: Not on file   Food Insecurity: Not on file   Transportation Needs: Not on file   Physical Activity: Not on file   Stress: Not on file  "  Social Connections: Not on file   Intimate Partner Violence: Not on file   Housing Stability: Not on file      Current Medications:     No current outpatient medications on file.     No current facility-administered medications for this visit.      Allergies:     Allergies   Allergen Reactions    Penicillins Rash      Physical Exam:     /74 (BP Location: Left arm, Patient Position: Sitting, Cuff Size: Standard)   Pulse 57   Temp (!) 95.9 °F (35.5 °C) (Tympanic)   Resp 16   Ht 6' 1.66\" (1.871 m)   Wt 95.6 kg (210 lb 12.8 oz)   SpO2 99%   BMI 27.31 kg/m²     Physical Exam  Vitals and nursing note reviewed.   Constitutional:       General: He is not in acute distress.     Appearance: Normal appearance.   HENT:      Head: Normocephalic and atraumatic.      Right Ear: Tympanic membrane and external ear normal.      Left Ear: Tympanic membrane and external ear normal.      Nose: Nose normal.      Mouth/Throat:      Mouth: Mucous membranes are moist.   Eyes:      Extraocular Movements: Extraocular movements intact.      Conjunctiva/sclera: Conjunctivae normal.      Pupils: Pupils are equal, round, and reactive to light.   Cardiovascular:      Rate and Rhythm: Normal rate and regular rhythm.      Pulses: Normal pulses.      Heart sounds: Normal heart sounds. No murmur heard.  Pulmonary:      Effort: Pulmonary effort is normal.      Breath sounds: Normal breath sounds. No wheezing, rhonchi or rales.   Abdominal:      General: Bowel sounds are normal.      Palpations: Abdomen is soft.      Tenderness: There is no abdominal tenderness. There is no guarding.   Musculoskeletal:         General: No tenderness. Normal range of motion.      Cervical back: Normal range of motion.      Right lower leg: No edema.      Left lower leg: No edema.      Comments: Left shoulder: Mild decreased range of motion in internal rotation.  Full strength 5 out of 5.  Negative provocative testing.   Lymphadenopathy:      Cervical: No " cervical adenopathy.   Skin:     General: Skin is warm.      Capillary Refill: Capillary refill takes less than 2 seconds.   Neurological:      General: No focal deficit present.      Mental Status: He is alert and oriented to person, place, and time.   Psychiatric:         Mood and Affect: Mood normal.         Behavior: Behavior normal.          DO BILLIE Hansen [facility]

## 2024-03-15 NOTE — ASSESSMENT & PLAN NOTE
- Improved from previous.  Still has periods where it flares up.  -Recommend starting physical therapy.  Patient will hold off for now.  -Home exercise program discussed.   -Follow-up as needed.

## 2024-03-15 NOTE — PATIENT INSTRUCTIONS
Shoulder Bursitis   WHAT YOU NEED TO KNOW:   Shoulder bursitis is inflammation of the bursa in your shoulder. The bursa is a fluid-filled sac that acts as a cushion between a bone and a tendon. A tendon is a cord of strong tissue that connects muscles to bones.       DISCHARGE INSTRUCTIONS:   Call your doctor if:   You have increased redness, pain, and swelling.    Your symptoms do not improve with treatment.    You have a fever.    You have questions or concerns about your condition or care.    Medicines:  You may need any of the following:  NSAIDs , such as ibuprofen, help decrease swelling, pain, and fever. This medicine is available with or without a doctor's order. NSAIDs can cause stomach bleeding or kidney problems in certain people. If you take blood thinner medicine, always ask if NSAIDs are safe for you. Always read the medicine label and follow directions. Do not give these medicines to children younger than 6 months without direction from a healthcare provider.     Aspirin  helps relieve pain and swelling. Take aspirin exactly as directed by your healthcare provider.    Antibiotics  help treat or prevent a bacterial infection.    Steroids  help relieve pain and swelling. Steroids may be given for a short time for acute pain.    Do not give aspirin to children younger than 18 years.  Your child could develop Reye syndrome if he or she has the flu or a fever and takes aspirin. Reye syndrome can cause life-threatening brain and liver damage. Check your child's medicine labels for aspirin or salicylates.    Take your medicine as directed.  Contact your healthcare provider if you think your medicine is not helping or if you have side effects. Tell your provider if you are allergic to any medicine. Keep a list of the medicines, vitamins, and herbs you take. Include the amounts, and when and why you take them. Bring the list or the pill bottles to follow-up visits. Carry your medicine list with you in case of  an emergency.    Manage shoulder bursitis:   Rest your shoulder as much as possible to decrease pain and swelling.  Slowly start to do more each day. Return to your daily activities as directed.    Apply ice to help decrease swelling and pain.  Use an ice pack, or put crushed ice in a plastic bag. Cover the bag with a towel before you place it on your shoulder. Apply ice for 15 to 20 minutes, 3 to 4 times each day, as directed.    Find a comfortable sleep position.  Sleep on the side that is not injured. You may be more comfortable if you sleep on your stomach or back.    Go to physical therapy, if directed.  A physical therapist teaches you exercises to help improve movement and strength, and to decrease pain.    Prevent shoulder bursitis:   Do not overuse your shoulders.  Shorten the time you spend swimming, playing tennis, or doing other overhead arm movements. Take breaks as you do these activities. Try not to do the same activities each day. For example, swim every other day or every 3 days instead of daily.    Always warm up and stretch before you exercise.  This will help loosen your muscles and decrease stress on your shoulder. Cool down after you exercise.    Prevent injury to your shoulders.  Wear shoulder pads or protectors when you play sports.    Try to keep pressure off your shoulders.  If you need to sleep on your side, do not lie on same side each night.    Manage health conditions that can lead to shoulder bursitis.  Your healthcare provider may recommend you to a specialist, such as an arthritis specialist.    Follow up with your doctor as directed:  Write down your questions so you remember to ask them during your visits.  © Copyright [employer] 2023 Information is for End User's use only and may not be sold, redistributed or otherwise used for commercial purposes.  The above information is an  only. It is not intended as medical advice for individual conditions or treatments. Talk to  your doctor, nurse or pharmacist before following any medical regimen to see if it is safe and effective for you.  Early Postoperative or Post Injury Shoulder Exercises   WHAT YOU NEED TO KNOW:   You may need to wait until your swelling and pain have gone down before you start to exercise. Do not start an exercise program before you talk to your healthcare provider.  DISCHARGE INSTRUCTIONS:   Call your doctor or physical therapist if:   You have sharp or worsening pain during exercise or at rest.    You have questions or concerns about your shoulder exercises.    Before you exercise:  Warm up and stretch before you exercise. Walk or ride a stationary bike for 5 to 10 minutes to help you warm up. Stretching helps increase range of motion. It may also decrease muscle soreness and help prevent another injury. Your healthcare provider or physical therapist will tell you which of the following stretches to do:  Crossover arm stretch:  Relax your shoulders. Hold your upper arm with the opposite hand. Pull your arm across your chest until you feel a stretch. Hold the stretch for 30 seconds. Return to the starting position.         Shoulder flexion stretch:  Stand facing a wall. Slowly walk your fingers up the wall until you feel a stretch. Hold the stretch for 30 seconds. Return to the starting position.         Sleeper stretch:  Lie on your injured side on a firm, flat surface. Bend the elbow of your injured arm 90° with your hand facing up. Use your arm that is not injured to slowly push your injured arm down. Stop when you feel a stretch at the back of your injured shoulder. Hold the stretch for 30 seconds. Slowly return to the starting position.       How to perform exercises without a weight or an exercise band:   Pendulum swings:  Lean forward and rest the arm that is not injured on a table. Do not round your back or lock your knees during the exercise. Let your other arm hang freely by your side. Gently swing your  injured arm forward and backward, side to side, and in circles.         Shrugs:  Stand with your arms by your side. Gently lift your shoulders up to your ears and hold for 5 seconds. With your shoulders lifted, pinch your shoulder blades together. Hold for 5 seconds. Slowly return to the starting position.       How to exercise with a weight:  Your healthcare provider or physical therapist will tell you how much weight to use. You may need to start with a light weight and work up to heavier weights. Hold a weight with your arm slightly in front of your body. Slowly raise your arm to the side with your thumb pointing up or down as directed. Stop when you reach the level of your shoulder. Hold for as many seconds as directed. Slowly return to the starting position.  How to exercise with an exercise band:  Your healthcare provider or physical therapist will tell you how much resistance to use.  Hold the exercise band with both hands in front of your body. Slowly raise your injured arm up and to the side with your thumb pointing up or down as directed. Stop when you reach the level of your shoulder. Hold for as many seconds as directed. Slowly return to the starting position.    Tie one end of the exercise band to a heavy object. Stand and hold the band in your hand. Bend your elbow. Keep your arm close to your side and pull the band straight back. Squeeze your shoulder blades together as you pull. Slowly return to the starting position.    Follow up with your doctor or physical therapist as directed:  Write down your questions so you remember to ask them at your visits.  © Copyright [employer] 2023 Information is for End User's use only and may not be sold, redistributed or otherwise used for commercial purposes.  The above information is an  only. It is not intended as medical advice for individual conditions or treatments. Talk to your doctor, nurse or pharmacist before following any medical regimen to  see if it is safe and effective for you.      Wellness Visit for Adults   AMBULATORY CARE:   A wellness visit  is when you see your healthcare provider to get screened for health problems. Your healthcare provider will also give you advice on how to stay healthy. Write down your questions so you remember to ask them. Ask your healthcare provider how often you should have a wellness visit.  What happens at a wellness visit:  Your healthcare provider will ask about your health, and your family history of health problems. This includes high blood pressure, heart disease, and cancer. He or she will ask if you have symptoms that concern you, if you smoke, and about your mood. You may also be asked about your intake of medicines, supplements, food, and alcohol. Any of the following may be done:  Your weight  will be checked. Your height may also be checked so your body mass index (BMI) can be calculated. Your BMI shows if you are at a healthy weight.    Your blood pressure  and heart rate will be checked. Your temperature may also be checked.    Blood and urine tests  may be done. Blood tests may be done to check your cholesterol levels. Abnormal cholesterol levels increase your risk for heart disease and stroke. You may also need a blood or urine test to check for diabetes if you are at increased risk. Urine tests may be done to look for signs of an infection or kidney disease.    A physical exam  includes checking your heartbeat and lungs with a stethoscope. Your healthcare provider may also check your skin to look for sun damage.    Screening tests  may be recommended. A screening test is done to check for diseases that may not cause symptoms. The screening tests you may need depend on your age, gender, family history, and lifestyle habits. For example, colorectal screening may be recommended if you are 50 years old or older.    Screening tests you need if you are a woman:   A Pap smear  is used to screen for cervical  cancer. Pap smears are usually done every 3 to 5 years depending on your age. You may need them more often if you have had abnormal Pap smear test results in the past. Ask your healthcare provider how often you should have a Pap smear.    A mammogram  is an x-ray of your breasts to screen for breast cancer. Experts recommend mammograms every 2 years starting at age 50 years. You may need a mammogram at age 49 years or younger if you have an increased risk for breast cancer. Talk to your healthcare provider about when you should start having mammograms and how often you need them.    Vaccines you may need:   Get an influenza vaccine  every year. The influenza vaccine protects you from the flu. Several types of viruses cause the flu. The viruses change over time, so new vaccines are made each year.    Get a tetanus-diphtheria (Td) booster vaccine  every 10 years. This vaccine protects you against tetanus and diphtheria. Tetanus is a severe infection that may cause painful muscle spasms and lockjaw. Diphtheria is a severe bacterial infection that causes a thick covering in the back of your mouth and throat.    Get a human papillomavirus (HPV) vaccine  if you are female and aged 19 to 26 or male 19 to 21 and never received it. This vaccine protects you from HPV infection. HPV is the most common infection spread by sexual contact. HPV may also cause vaginal, penile, and anal cancers.    Get a pneumococcal vaccine  if you are aged 65 years or older. The pneumococcal vaccine is an injection given to protect you from pneumococcal disease. Pneumococcal disease is an infection caused by pneumococcal bacteria. The infection may cause pneumonia, meningitis, or an ear infection.    Get a shingles vaccine  if you are 60 or older, even if you have had shingles before. The shingles vaccine is an injection to protect you from the varicella-zoster virus. This is the same virus that causes chickenpox. Shingles is a painful rash that  develops in people who had chickenpox or have been exposed to the virus.    How to eat healthy:  My Plate is a model for planning healthy meals. It shows the types and amounts of foods that should go on your plate. Fruits and vegetables make up about half of your plate, and grains and protein make up the other half. A serving of dairy is included on the side of your plate. The amount of calories and serving sizes you need depends on your age, gender, weight, and height. Examples of healthy foods are listed below:  Eat a variety of vegetables  such as dark green, red, and orange vegetables. You can also include canned vegetables low in sodium (salt) and frozen vegetables without added butter or sauces.    Eat a variety of fresh fruits , canned fruit in 100% juice, frozen fruit, and dried fruit.    Include whole grains.  At least half of the grains you eat should be whole grains. Examples include whole-wheat bread, wheat pasta, brown rice, and whole-grain cereals such as oatmeal.    Eat a variety of protein foods such as seafood (fish and shellfish), lean meat, and poultry without skin (turkey and chicken). Examples of lean meats include pork leg, shoulder, or tenderloin, and beef round, sirloin, tenderloin, and extra lean ground beef. Other protein foods include eggs and egg substitutes, beans, peas, soy products, nuts, and seeds.    Choose low-fat dairy products such as skim or 1% milk or low-fat yogurt, cheese, and cottage cheese.    Limit unhealthy fats  such as butter, hard margarine, and shortening.       Exercise:  Exercise at least 30 minutes per day on most days of the week. Some examples of exercise include walking, biking, dancing, and swimming. You can also fit in more physical activity by taking the stairs instead of the elevator or parking farther away from stores. Include muscle strengthening activities 2 days each week. Regular exercise provides many health benefits. It helps you manage your weight,  and decreases your risk for type 2 diabetes, heart disease, stroke, and high blood pressure. Exercise can also help improve your mood. Ask your healthcare provider about the best exercise plan for you.       General health and safety guidelines:   Do not smoke.  Nicotine and other chemicals in cigarettes and cigars can cause lung damage. Ask your healthcare provider for information if you currently smoke and need help to quit. E-cigarettes or smokeless tobacco still contain nicotine. Talk to your healthcare provider before you use these products.    Limit alcohol.  A drink of alcohol is 12 ounces of beer, 5 ounces of wine, or 1½ ounces of liquor.    Lose weight, if needed.  Being overweight increases your risk of certain health conditions. These include heart disease, high blood pressure, type 2 diabetes, and certain types of cancer.    Protect your skin.  Do not sunbathe or use tanning beds. Use sunscreen with a SPF 15 or higher. Apply sunscreen at least 15 minutes before you go outside. Reapply sunscreen every 2 hours. Wear protective clothing, hats, and sunglasses when you are outside.    Drive safely.  Always wear your seatbelt. Make sure everyone in your car wears a seatbelt. A seatbelt can save your life if you are in an accident. Do not use your cell phone when you are driving. This could distract you and cause an accident. Pull over if you need to make a call or send a text message.    Practice safe sex.  Use latex condoms if are sexually active and have more than one partner. Your healthcare provider may recommend screening tests for sexually transmitted infections (STIs).    Wear helmets, lifejackets, and protective gear.  Always wear a helmet when you ride a bike or motorcycle, go skiing, or play sports that could cause a head injury. Wear protective equipment when you play sports. Wear a lifejacket when you are on a boat or doing water sports.    © Copyright [employer] 2023 Information is for End User's  use only and may not be sold, redistributed or otherwise used for commercial purposes.  The above information is an  only. It is not intended as medical advice for individual conditions or treatments. Talk to your doctor, nurse or pharmacist before following any medical regimen to see if it is safe and effective for you.

## 2025-01-27 ENCOUNTER — TELEPHONE (OUTPATIENT)
Age: 45
End: 2025-01-27

## 2025-01-27 NOTE — LETTER
Ricky Perez,      Attached are your prep instructions for your upcoming colonoscopy scheduled on Tuesday 2/25/2025 with Dr. Horvath.  On Monday 2/24/205 the GI lab will be calling between 2pm and 6pm with your arrival time for your procedure.   If you have any questions, please feel free to contact our office at [phone].      Address to our location:    [facility]   [address] [facility] [address]  [address] [place] [address], [id number]    GI LAB - 1st floor        Thank you for choosing [facility]!                                                              COLONOSCOPY  MIRALAX/Dulcolax Bowel Preparation Instructions    The OR/GI Lab will contact you the evening prior to your procedure with your exact arrival time.    Our practice requires a 1 week notice for any cancellations or rescheduling. We kindly ask that you immediately notify us of any changes including any new medications that are prescribed. Thank you for your cooperation.     WEEK BEFORE YOUR PROCEDURE:  Stop taking Iron tablets.  5 days prior, AVOID vegetables and fruits with skins or seeds, nuts, corn, popcorn and whole grain breads.   Purchase: One (1) 238-gram container of Miralax (polyethylene glycol 3350), four (4) 5 mg Dulcolax (bisacodyl) tablets, and one (1) 64-ounce bottle of Gatorade (sports drink) - no red, orange, or purple. These may be purchased at any pharmacy without a prescription. Generic products are permissible.   Arrange responsible transportation for day of the procedure.     DAY BEFORE THE PROCEDURE:   CLEAR liquids only for entire day prior. Nothing red, orange or purple.    You MAY have:                                                               Soda  Water  Broth Gatorade  Jello  Popsicles Coffee/tea without milk/creamer     YOU MAY NOT HAVE:  Solid foods   Milk and milk products    Juice with pulp    BOWEL PREPARATION:  Includes: One  (1) 238-gram container of Miralax (polyethylene glycol 3350), four (4) 5 mg Dulcolax (bisacodyl) tablets, and one (1) 64-ounce bottle of Gatorade (sports drink).  Preparation may be refrigerated.  Entire bowel prep should be completed.     Afternoon before the procedure (2:00 pm - 5:00 pm):    Take two (2) 5 mg Dulcolax laxative tablets.     Evening before the procedure (6:00 pm):  Mix entire container of Miralax with one (1) 64-ounce bottle of Gatorade and shake until all medication is dissolved.   Begin drinking solution. Drink an eight (8) ounce cup every 10-15 minutes until you have consumed half (32 ounces) of the solution.  Refrigerate remaining solution.    Night before the procedure (8:00 pm):  Take two (2) 5 mg Dulcolax laxative tablets.     Beginning 5 hours before your procedure:  Drink the remaining amount of prepared solution (32 ounces).  Drink an eight (8) ounce cup every 10-15 minutes until you have consumed the remaining solution.     Bowel prep should be completed 4 hours prior to procedure time.    NOTHING TO EAT OR DRINK AFTER MIDNIGHT- EXCEPT FOR YOUR PREP    DAY OF THE PROCEDURE:  You may brush your teeth.  Leave all jewelry at home.  Please arrive for your procedure as indicated by the OR / GI Lab / Endoscopy Unit. The hospital will contact you the day before with your exact arrival time.   Make sure you have arranged ahead of time for a responsible adult (18 or older) to accompany and drive you home after the procedure.  Please discuss any transportation concerns with our staff prior to your procedure.    The effects of the anesthesia can persist for 24 hours.  After receiving the sedation, you must exercise caution before engaging in any activity that could harm yourself and others (such as driving a car).  Do not make any important decisions or do not drink any alcoholic beverages during this time period.  After your procedure, you may have anything you'd like to eat or drink.  You will  probably want to start with something light.  Please include plenty of fluids.  Avoid items that cause gas such as sodas and salads.    SPECIAL INSTRUCTIONS:    For patients currently taking blood thinners and/or antiplatelet therapy our office will contact the prescribing provider.  Our office will contact you with any required changes to your medication regimen.     Blood thinner (i.e. - Coumadin, Pradaxa, Lovenox, Xarelto, Eliquis)  ?  Continue (Do Not Stop)  ? Stop______________for_____________days prior to the procedure.    Antiplatelet (i.e. - Plavix, Aggrenox, Effient, Brilinta)  ?  Continue (Do Not Stop)  ? Stop______________for_____________days prior to the procedure.       Diabetes:   If you are Diabetic, please see separate Diabetic Instruction Sheet.          Prescribed medications:  Do not stop your aspirin, or any of your other medications (unless instructed otherwise).    Take the rest of your prescribed medications with small sips of water at least 2 hours prior to your procedure.                                                                                                              For any questions or concerns related to your bowel preparation or pre-procedure instructions, please contact our office at [phone].  Thank you for choosing [facility]!

## 2025-01-27 NOTE — TELEPHONE ENCOUNTER
01/27/25  Screened by: Marialuisa Serrano    Referring Provider PCP    Pre- Screening:     There is no height or weight on file to calculate BMI. BMI- 27.31  Has patient been referred for a routine screening Colonoscopy? yes  Is the patient between 45-75 years old? yes      Previous Colonoscopy no   If yes:    Date:     Facility:     Reason:         Does the patient want to see a Gastroenterologist prior to their procedure OR are they having any GI symptoms? no    Has the patient been hospitalized or had abdominal surgery in the past 6 months? no    Does the patient use supplemental oxygen? no    Does the patient take Coumadin, Lovenox, Plavix, Elliquis, Xarelto, or other blood thinning medication? no    Has the patient had a stroke, cardiac event, or stent placed in the past year? no      If patient is between 45yrs - 49yrs, please advise patient that we will have to confirm benefits & coverage with their insurance company for a routine screening colonoscopy.

## 2025-01-27 NOTE — TELEPHONE ENCOUNTER
Scheduled date of colonoscopy (as of today):TUES. 2/25/2025  Physician performing colonoscopy: Dr. Horvath  Location of colonoscopy:[facility]  Bowel prep reviewed with patient: SHAINA / MAYNOR  Instructions reviewed with patient by:FW - Sent via [email or web address]  Clearances: N/A

## 2025-02-21 ENCOUNTER — TELEPHONE (OUTPATIENT)
Dept: GASTROENTEROLOGY | Facility: CLINIC | Age: 45
End: 2025-02-21

## 2025-02-21 NOTE — TELEPHONE ENCOUNTER
Pt returning a call and wanting to rs his 2/25/25 scheduled colonoscopy w/ Dr. Horvath at [place]. I warm transfer to [facility].

## 2025-02-21 NOTE — TELEPHONE ENCOUNTER
S/w pt- he may have a scheduling conflict and have to reschedule his colonoscopy will call back to confirm

## 2025-02-21 NOTE — TELEPHONE ENCOUNTER
Pt called back, rescheduled procedure from 2/25/25 (scheduling conflict) to 3/6/25 with Dr. Horvath @ [facility]-pt confirmed procedure. Sent proc prep instructions, Miralax/Dulcolax, via [facility].    Scheduled date of colonoscopy (as of today): 3/6/2025  Physician performing colonoscopy: Dr. Horvath  Location of colonoscopy: [facility]  Desired bowel prep reviewed with patient: Miralax/dulcolax-[facility]  Instructions reviewed with patient by: Mary  Clearances: N/A

## 2025-03-06 ENCOUNTER — ANESTHESIA (OUTPATIENT)
Dept: GASTROENTEROLOGY | Facility: HOSPITAL | Age: 45
End: 2025-03-06
Payer: COMMERCIAL

## 2025-03-06 ENCOUNTER — ANESTHESIA EVENT (OUTPATIENT)
Dept: GASTROENTEROLOGY | Facility: HOSPITAL | Age: 45
End: 2025-03-06
Payer: COMMERCIAL

## 2025-03-06 ENCOUNTER — HOSPITAL ENCOUNTER (OUTPATIENT)
Dept: GASTROENTEROLOGY | Facility: HOSPITAL | Age: 45
Setting detail: OUTPATIENT SURGERY
End: 2025-03-06
Attending: INTERNAL MEDICINE
Payer: COMMERCIAL

## 2025-03-06 VITALS
DIASTOLIC BLOOD PRESSURE: 73 MMHG | TEMPERATURE: 97.6 F | WEIGHT: 211.8 LBS | BODY MASS INDEX: 27.18 KG/M2 | OXYGEN SATURATION: 100 % | HEART RATE: 47 BPM | HEIGHT: 74 IN | SYSTOLIC BLOOD PRESSURE: 106 MMHG | RESPIRATION RATE: 12 BRPM

## 2025-03-06 DIAGNOSIS — Z12.11 SCREENING FOR COLON CANCER: ICD-10-CM

## 2025-03-06 PROCEDURE — 88305 TISSUE EXAM BY PATHOLOGIST: CPT | Performed by: PATHOLOGY

## 2025-03-06 PROCEDURE — 45380 COLONOSCOPY AND BIOPSY: CPT | Performed by: INTERNAL MEDICINE

## 2025-03-06 RX ORDER — SODIUM CHLORIDE, SODIUM LACTATE, POTASSIUM CHLORIDE, CALCIUM CHLORIDE 600; 310; 30; 20 MG/100ML; MG/100ML; MG/100ML; MG/100ML
INJECTION, SOLUTION INTRAVENOUS CONTINUOUS PRN
Status: DISCONTINUED | OUTPATIENT
Start: 2025-03-06 | End: 2025-03-06

## 2025-03-06 RX ORDER — METOCLOPRAMIDE HYDROCHLORIDE 5 MG/ML
10 INJECTION INTRAMUSCULAR; INTRAVENOUS ONCE AS NEEDED
Status: CANCELLED | OUTPATIENT
Start: 2025-03-06

## 2025-03-06 RX ORDER — DIPHENHYDRAMINE HYDROCHLORIDE 50 MG/ML
12.5 INJECTION INTRAMUSCULAR; INTRAVENOUS ONCE AS NEEDED
Status: CANCELLED | OUTPATIENT
Start: 2025-03-06

## 2025-03-06 RX ORDER — PROPOFOL 10 MG/ML
INJECTION, EMULSION INTRAVENOUS AS NEEDED
Status: DISCONTINUED | OUTPATIENT
Start: 2025-03-06 | End: 2025-03-06

## 2025-03-06 RX ORDER — LIDOCAINE HYDROCHLORIDE 10 MG/ML
INJECTION, SOLUTION EPIDURAL; INFILTRATION; INTRACAUDAL; PERINEURAL AS NEEDED
Status: DISCONTINUED | OUTPATIENT
Start: 2025-03-06 | End: 2025-03-06

## 2025-03-06 RX ORDER — ONDANSETRON 2 MG/ML
4 INJECTION INTRAMUSCULAR; INTRAVENOUS ONCE AS NEEDED
Status: CANCELLED | OUTPATIENT
Start: 2025-03-06

## 2025-03-06 RX ORDER — LIDOCAINE HYDROCHLORIDE 10 MG/ML
0.5 INJECTION, SOLUTION EPIDURAL; INFILTRATION; INTRACAUDAL; PERINEURAL ONCE AS NEEDED
Status: DISCONTINUED | OUTPATIENT
Start: 2025-03-06 | End: 2025-03-10 | Stop reason: HOSPADM

## 2025-03-06 RX ADMIN — LIDOCAINE HYDROCHLORIDE 50 MG: 10 INJECTION, SOLUTION EPIDURAL; INFILTRATION; INTRACAUDAL at 11:16

## 2025-03-06 RX ADMIN — PROPOFOL 50 MG: 10 INJECTION, EMULSION INTRAVENOUS at 11:24

## 2025-03-06 RX ADMIN — PROPOFOL 100 MG: 10 INJECTION, EMULSION INTRAVENOUS at 11:28

## 2025-03-06 RX ADMIN — PROPOFOL 150 MG: 10 INJECTION, EMULSION INTRAVENOUS at 11:18

## 2025-03-06 RX ADMIN — SODIUM CHLORIDE, SODIUM LACTATE, POTASSIUM CHLORIDE, AND CALCIUM CHLORIDE: .6; .31; .03; .02 INJECTION, SOLUTION INTRAVENOUS at 11:11

## 2025-03-06 RX ADMIN — PROPOFOL 100 MG: 10 INJECTION, EMULSION INTRAVENOUS at 11:16

## 2025-03-06 NOTE — ANESTHESIA POSTPROCEDURE EVALUATION
Post-Op Assessment Note    CV Status:  Stable  Pain Score: 0    Pain management: adequate       Mental Status:  Sleepy   Hydration Status:  Euvolemic   PONV Controlled:  Controlled   Airway Patency:  Patent     Post Op Vitals Reviewed: Yes    No anethesia notable event occurred.    Staff: CRNA           Last Filed PACU Vitals:  Vitals Value Taken Time   Temp 98.0    Pulse 78    /66    Resp 18    SpO2 97

## 2025-03-06 NOTE — ANESTHESIA PREPROCEDURE EVALUATION
Procedure:  COLONOSCOPY    Relevant Problems   ANESTHESIA (within normal limits)      CARDIO (within normal limits)      ENDO (within normal limits)      GI/HEPATIC (within normal limits)      /RENAL (within normal limits)      HEMATOLOGY (within normal limits)      MUSCULOSKELETAL   (+) Impingement syndrome of left shoulder      NEURO/PSYCH (within normal limits)        Physical Exam    Airway    Mallampati score: II  TM Distance: >3 FB  Neck ROM: full     Dental   No notable dental hx     Cardiovascular  Rhythm: regular, Rate: normal, Cardiovascular exam normal    Pulmonary  Pulmonary exam normal Breath sounds clear to auscultation    Other Findings        Anesthesia Plan  ASA Score- 1     Anesthesia Type- IV sedation with anesthesia with ASA Monitors.         Additional Monitors:     Airway Plan:            Plan Factors-Exercise tolerance (METS): >4 METS.    Chart reviewed. EKG reviewed. Imaging results reviewed. Existing labs reviewed. Patient summary reviewed.                  Induction- intravenous.    Postoperative Plan-     Perioperative Resuscitation Plan - Level 1 - Full Code.       Informed Consent- Anesthetic plan and risks discussed with patient.  I personally reviewed this patient with the CRNA. Discussed and agreed on the Anesthesia Plan with the CRNA..      NPO Status:  Vitals Value Taken Time   Date of last liquid 03/06/25 03/06/25 1043   Time of last liquid 0730 03/06/25 1043   Date of last solid 03/04/25 03/06/25 1043   Time of last solid       Recent labs personally reviewed:  Lab Results   Component Value Date    WBC 5.36 03/22/2023    HGB 14.8 03/22/2023     03/22/2023     Lab Results   Component Value Date    K 4.4 03/22/2023    BUN 16 03/22/2023    CREATININE 1.07 03/22/2023         I, To Still MD, have personally seen and evaluated the patient prior to anesthetic care.  I have reviewed the pre-anesthetic record, and other medical records if appropriate to the anesthetic  care.  If a CRNA is involved in the case, I have reviewed the CRNA assessment, if present, and agree. Risks/benefits and alternatives discussed with patient including possible PONV, sore throat, and possibility of rare anesthetic and surgical emergencies.

## 2025-03-06 NOTE — H&P
"History and Physical -  [facility]  Chris Melvin 44 y.o. male MRN: [id number]                  HPI: Chris Melvin is a 44 y.o. year old male who presents for colonoscopy      REVIEW OF SYSTEMS: Per the HPI, and otherwise unremarkable.    Historical Information   History reviewed. No pertinent past medical history.  Past Surgical History:   Procedure Laterality Date    APPENDECTOMY  2021    TX LAPAROSCOPIC APPENDECTOMY N/A 01/03/2021    Procedure: APPENDECTOMY LAPAROSCOPIC;  Surgeon: Avery King MD;  Location: [facility];  Service: General    WISDOM TOOTH EXTRACTION       Social History   Social History     Substance and Sexual Activity   Alcohol Use Never     Social History     Substance and Sexual Activity   Drug Use Never     Social History     Tobacco Use   Smoking Status Never   Smokeless Tobacco Never     History reviewed. No pertinent family history.    Meds/Allergies     No current outpatient medications on file.    Current Facility-Administered Medications:     lidocaine (PF) (XYLOCAINE-MPF) 1 % injection 0.5 mL, 0.5 mL, Infiltration, Once PRN    Allergies   Allergen Reactions    Penicillins Rash       Objective     /65   Pulse (!) 42 Comment: ANES aware  Temp (!) 97.2 °F (36.2 °C) (Temporal)   Resp 14   Ht 6' 1.5\" (1.867 m)   Wt 96.1 kg (211 lb 12.8 oz)   SpO2 100%   BMI 27.56 kg/m²       PHYSICAL EXAM    Gen: NAD  Head: NCAT  CV: RRR  CHEST: Clear  ABD: soft, NT/ND  EXT: no edema      ASSESSMENT/PLAN:  This is a 44 y.o. year old male here for colonoscopy, and he is stable and optimized for his procedure.       "

## 2025-03-11 ENCOUNTER — RESULTS FOLLOW-UP (OUTPATIENT)
Dept: GASTROENTEROLOGY | Facility: CLINIC | Age: 45
End: 2025-03-11

## 2025-03-11 PROCEDURE — 88305 TISSUE EXAM BY PATHOLOGIST: CPT | Performed by: PATHOLOGY

## 2025-04-29 ENCOUNTER — TELEPHONE (OUTPATIENT)
Age: 45
End: 2025-04-29

## 2025-04-29 DIAGNOSIS — Z13.6 ENCOUNTER FOR LIPID SCREENING FOR CARDIOVASCULAR DISEASE: Primary | ICD-10-CM

## 2025-04-29 DIAGNOSIS — Z13.1 SCREENING FOR DIABETES MELLITUS (DM): ICD-10-CM

## 2025-04-29 DIAGNOSIS — Z13.220 ENCOUNTER FOR LIPID SCREENING FOR CARDIOVASCULAR DISEASE: Primary | ICD-10-CM

## 2025-04-29 NOTE — TELEPHONE ENCOUNTER
Patient is scheduled for an annual physical tomorrow, 4/30.  He called to find out if he would need to have labs done prior to the visit as there aren't any active orders in his chart.  He has eaten already this morning, but can go for labs this evening or tomorrow morning if required.  Please advise what would be recommended.  Thank you!

## 2025-04-29 NOTE — TELEPHONE ENCOUNTER
Please call patient back let him know that if he would like he can get blood work in the morning prior to the appointment if possible.  I have put some orders in.  If he is not able to do that is okay he can do it after and I will message him with the results.  We will see him tomorrow either way.  Thank you.

## 2025-04-30 ENCOUNTER — OFFICE VISIT (OUTPATIENT)
Dept: FAMILY MEDICINE CLINIC | Facility: CLINIC | Age: 45
End: 2025-04-30
Payer: COMMERCIAL

## 2025-04-30 ENCOUNTER — APPOINTMENT (OUTPATIENT)
Dept: LAB | Age: 45
End: 2025-04-30
Attending: FAMILY MEDICINE
Payer: COMMERCIAL

## 2025-04-30 VITALS
OXYGEN SATURATION: 99 % | HEART RATE: 41 BPM | BODY MASS INDEX: 26.08 KG/M2 | DIASTOLIC BLOOD PRESSURE: 60 MMHG | SYSTOLIC BLOOD PRESSURE: 96 MMHG | TEMPERATURE: 96 F | WEIGHT: 203.2 LBS | HEIGHT: 74 IN

## 2025-04-30 DIAGNOSIS — Z13.220 ENCOUNTER FOR LIPID SCREENING FOR CARDIOVASCULAR DISEASE: ICD-10-CM

## 2025-04-30 DIAGNOSIS — Z13.6 ENCOUNTER FOR LIPID SCREENING FOR CARDIOVASCULAR DISEASE: ICD-10-CM

## 2025-04-30 DIAGNOSIS — Z00.00 ANNUAL PHYSICAL EXAM: Primary | ICD-10-CM

## 2025-04-30 DIAGNOSIS — R00.1 BRADYCARDIA: ICD-10-CM

## 2025-04-30 DIAGNOSIS — Z13.1 SCREENING FOR DIABETES MELLITUS (DM): ICD-10-CM

## 2025-04-30 LAB
ALBUMIN SERPL BCG-MCNC: 4.4 G/DL (ref 3.5–5)
ALP SERPL-CCNC: 65 U/L (ref 34–104)
ALT SERPL W P-5'-P-CCNC: 15 U/L (ref 7–52)
ANION GAP SERPL CALCULATED.3IONS-SCNC: 6 MMOL/L (ref 4–13)
AST SERPL W P-5'-P-CCNC: 18 U/L (ref 13–39)
BILIRUB SERPL-MCNC: 1.01 MG/DL (ref 0.2–1)
BUN SERPL-MCNC: 16 MG/DL (ref 5–25)
CALCIUM SERPL-MCNC: 9.1 MG/DL (ref 8.4–10.2)
CHLORIDE SERPL-SCNC: 104 MMOL/L (ref 96–108)
CHOLEST SERPL-MCNC: 215 MG/DL (ref ?–200)
CO2 SERPL-SCNC: 30 MMOL/L (ref 21–32)
CREAT SERPL-MCNC: 0.96 MG/DL (ref 0.6–1.3)
GFR SERPL CREATININE-BSD FRML MDRD: 95 ML/MIN/1.73SQ M
GLUCOSE P FAST SERPL-MCNC: 88 MG/DL (ref 65–99)
HDLC SERPL-MCNC: 59 MG/DL
LDLC SERPL CALC-MCNC: 139 MG/DL (ref 0–100)
POTASSIUM SERPL-SCNC: 4 MMOL/L (ref 3.5–5.3)
PROT SERPL-MCNC: 6.9 G/DL (ref 6.4–8.4)
SODIUM SERPL-SCNC: 140 MMOL/L (ref 135–147)
TRIGL SERPL-MCNC: 86 MG/DL (ref ?–150)

## 2025-04-30 PROCEDURE — 99396 PREV VISIT EST AGE 40-64: CPT | Performed by: FAMILY MEDICINE

## 2025-04-30 PROCEDURE — 36415 COLL VENOUS BLD VENIPUNCTURE: CPT

## 2025-04-30 PROCEDURE — 80053 COMPREHEN METABOLIC PANEL: CPT

## 2025-04-30 PROCEDURE — 80061 LIPID PANEL: CPT

## 2025-04-30 NOTE — PROGRESS NOTES
Adult Annual Physical  Name: Chris Mlevin      : 1980      MRN: [id number]  Encounter Provider: Zach Figueroa DO  Encounter Date: 2025   Encounter department: [facility]    :  Assessment & Plan  Annual physical exam         Bradycardia  - Noted to be bradycardic in office.  Asymptomatic.  Patient does note he continues to run a few times a week.  Notes has kept this up for some time.  -Does monitor heart rate with his watch and always in the low 50s and 40s as well.  -EKG in office shows sinus bradycardia without any signs of ischemia.  -Will recommend referral to cardiology for further evaluation  Orders:  •  POCT ECG  •  Ambulatory Referral to Cardiology; Future        Preventive Screenings:  - Diabetes Screening: screening up-to-date  - Cholesterol Screening: screening up-to-date   - Hepatitis C screening: screening up-to-date   - HIV screening: screening up-to-date   - Colon cancer screening: screening up-to-date   - Lung cancer screening: screening not indicated     Immunizations:    - Risks/benefits immunizations discussed           History of Present Illness     Adult Annual Physical:  Patient presents for annual physical.     Diet and Physical Activity:  - Diet/Nutrition: no special diet.  - Exercise: vigorous cardiovascular exercise, strength training exercises, 3-4 times a week on average and less than 30 minutes on average.    Depression Screening:  - PHQ-2 Score: 0    General Health:  - Sleep: 4-6 hours of sleep on average.  - Hearing: normal hearing right ear, normal hearing left ear and normal hearing bilateral ears.  - Vision: no vision problems.  - Dental: regular dental visits.    /GYN Health:  - Follows with GYN: no.   - History of STDs: no     Health:  - History of STDs: no.   - Urinary symptoms: none.     Advanced Care Planning:  - Has an advanced directive?: yes    - Has a durable medical POA?: yes    - ACP document given to patient?: no      Review of  "Systems   Constitutional:  Negative for chills and fever.   HENT:  Negative for ear pain and sore throat.    Eyes:  Negative for pain and visual disturbance.   Respiratory:  Negative for cough and shortness of breath.    Cardiovascular:  Negative for chest pain and palpitations.   Gastrointestinal:  Negative for abdominal pain and vomiting.   Genitourinary:  Negative for dysuria and hematuria.   Musculoskeletal:  Negative for arthralgias and back pain.   Skin:  Negative for color change and rash.   Neurological:  Negative for seizures and syncope.   Psychiatric/Behavioral:  Negative for confusion, sleep disturbance and suicidal ideas. The patient is not nervous/anxious.    All other systems reviewed and are negative.        Objective   BP 96/60 (BP Location: Left arm, Patient Position: Sitting, Cuff Size: Large)   Pulse (!) 41   Temp (!) 96 °F (35.6 °C) (Tympanic)   Ht 6' 1.66\" (1.871 m)   Wt 92.2 kg (203 lb 3.2 oz)   SpO2 99%   BMI 26.33 kg/m²     Physical Exam  Vitals and nursing note reviewed.   Constitutional:       General: He is not in acute distress.     Appearance: Normal appearance.   HENT:      Head: Normocephalic and atraumatic.      Right Ear: Tympanic membrane and external ear normal.      Left Ear: Tympanic membrane and external ear normal.      Nose: Nose normal.      Mouth/Throat:      Mouth: Mucous membranes are moist.   Eyes:      Extraocular Movements: Extraocular movements intact.      Conjunctiva/sclera: Conjunctivae normal.      Pupils: Pupils are equal, round, and reactive to light.   Cardiovascular:      Rate and Rhythm: Regular rhythm. Bradycardia present.      Pulses: Normal pulses.      Heart sounds: No murmur heard.  Pulmonary:      Effort: Pulmonary effort is normal.      Breath sounds: Normal breath sounds. No wheezing, rhonchi or rales.   Abdominal:      General: Bowel sounds are normal.      Palpations: Abdomen is soft.      Tenderness: There is no abdominal tenderness. There is " no guarding.   Musculoskeletal:         General: Normal range of motion.      Cervical back: Normal range of motion.      Right lower leg: No edema.      Left lower leg: No edema.   Lymphadenopathy:      Cervical: No cervical adenopathy.   Skin:     General: Skin is warm.      Capillary Refill: Capillary refill takes less than 2 seconds.   Neurological:      General: No focal deficit present.      Mental Status: He is alert and oriented to person, place, and time.   Psychiatric:         Mood and Affect: Mood normal.         Behavior: Behavior normal.

## 2025-05-05 ENCOUNTER — RESULTS FOLLOW-UP (OUTPATIENT)
Dept: FAMILY MEDICINE CLINIC | Facility: CLINIC | Age: 45
End: 2025-05-05

## 2025-08-21 ENCOUNTER — CONSULT (OUTPATIENT)
Dept: CARDIOLOGY CLINIC | Facility: CLINIC | Age: 45
End: 2025-08-21
Attending: FAMILY MEDICINE
Payer: COMMERCIAL

## 2025-08-21 VITALS
HEIGHT: 74 IN | BODY MASS INDEX: 27.46 KG/M2 | DIASTOLIC BLOOD PRESSURE: 60 MMHG | SYSTOLIC BLOOD PRESSURE: 94 MMHG | HEART RATE: 55 BPM | WEIGHT: 214 LBS | OXYGEN SATURATION: 99 %

## 2025-08-21 DIAGNOSIS — R00.2 PALPITATIONS: ICD-10-CM

## 2025-08-21 DIAGNOSIS — E78.2 MODERATE MIXED HYPERLIPIDEMIA NOT REQUIRING STATIN THERAPY: ICD-10-CM

## 2025-08-21 DIAGNOSIS — R00.1 BRADYCARDIA: Primary | ICD-10-CM

## 2025-08-21 PROCEDURE — 99204 OFFICE O/P NEW MOD 45 MIN: CPT | Performed by: INTERNAL MEDICINE

## (undated) DEVICE — GLOVE SRG BIOGEL ECLIPSE 7.5

## (undated) DEVICE — SUT PDS II 4-0 PS-2 18 IN Z496G

## (undated) DEVICE — TISSUE RETRIEVAL SYSTEM: Brand: INZII RETRIEVAL SYSTEM

## (undated) DEVICE — INTENDED FOR TISSUE SEPARATION, AND OTHER PROCEDURES THAT REQUIRE A SHARP SURGICAL BLADE TO PUNCTURE OR CUT.: Brand: BARD-PARKER SAFETY BLADES SIZE 15, STERILE

## (undated) DEVICE — TROCARS: Brand: KII® BALLOON BLUNT TIP SYSTEM

## (undated) DEVICE — TROCAR: Brand: KII® SLEEVE

## (undated) DEVICE — TUBING SMOKE EVAC W/FILTRATION DEVICE PLUMEPORT ACTIV

## (undated) DEVICE — TROCAR: Brand: KII FIOS FIRST ENTRY

## (undated) DEVICE — CHLORAPREP HI-LITE 26ML ORANGE

## (undated) DEVICE — ENDOPATH ETS45 2.5MM RELOADS (VASCULAR/THIN): Brand: ENDOPATH

## (undated) DEVICE — IRRIG ENDO FLO TUBING

## (undated) DEVICE — ENDOPATH ETS-FLEX45 ARTICULATING ENDOSCOPIC LINEAR CUTTER, NO RELOAD: Brand: ENDOPATH

## (undated) DEVICE — VIAL DECANTER

## (undated) DEVICE — TUBING SMOKE EVAC W/FILTRATION DEVICE PLUMEPORT

## (undated) DEVICE — ENSEAL LAPAROSCOPIC TISSUE SEALER G2 CURVED JAW FOR USE WITH G2 GENERATOR 5MM DIAMETER 35CM SHAFT LENGTH: Brand: ENSEAL

## (undated) DEVICE — LIGHT HANDLE COVER SLEEVE DISP BLUE STELLAR

## (undated) DEVICE — ADHESIVE SKIN HIGH VISCOSITY EXOFIN 1ML

## (undated) DEVICE — ALLENTOWN LAP CHOLE APP PACK: Brand: CARDINAL HEALTH

## (undated) DEVICE — TRAY FOLEY 16FR URIMETER SURESTEP

## (undated) DEVICE — SUT VICRYL 0 UR-6 27 IN J603H

## (undated) DEVICE — PENCIL ELECTROSURG E-Z CLEAN -0035H

## (undated) DEVICE — DRAPE EQUIPMENT RF WAND